# Patient Record
Sex: MALE | Race: OTHER | Employment: FULL TIME | URBAN - METROPOLITAN AREA
[De-identification: names, ages, dates, MRNs, and addresses within clinical notes are randomized per-mention and may not be internally consistent; named-entity substitution may affect disease eponyms.]

---

## 2017-01-09 ENCOUNTER — LAB ENCOUNTER (OUTPATIENT)
Dept: LAB | Age: 52
End: 2017-01-09
Attending: FAMILY MEDICINE
Payer: COMMERCIAL

## 2017-01-09 ENCOUNTER — OFFICE VISIT (OUTPATIENT)
Dept: FAMILY MEDICINE CLINIC | Facility: CLINIC | Age: 52
End: 2017-01-09

## 2017-01-09 VITALS
WEIGHT: 298 LBS | DIASTOLIC BLOOD PRESSURE: 89 MMHG | HEIGHT: 65 IN | HEART RATE: 71 BPM | SYSTOLIC BLOOD PRESSURE: 137 MMHG | TEMPERATURE: 98 F | BODY MASS INDEX: 49.65 KG/M2

## 2017-01-09 DIAGNOSIS — Z00.00 WELL ADULT EXAM: ICD-10-CM

## 2017-01-09 DIAGNOSIS — E66.01 MORBID OBESITY WITH BMI OF 45.0-49.9, ADULT (HCC): ICD-10-CM

## 2017-01-09 DIAGNOSIS — E78.00 HYPERCHOLESTEREMIA: ICD-10-CM

## 2017-01-09 DIAGNOSIS — I10 ESSENTIAL HYPERTENSION: Primary | ICD-10-CM

## 2017-01-09 DIAGNOSIS — I10 ESSENTIAL HYPERTENSION: ICD-10-CM

## 2017-01-09 LAB
ALT SERPL-CCNC: 51 U/L (ref 17–63)
ANION GAP SERPL CALC-SCNC: 10 MMOL/L (ref 0–18)
AST SERPL-CCNC: 41 U/L (ref 15–41)
BASOPHILS # BLD: 0.2 K/UL (ref 0–0.2)
BASOPHILS NFR BLD: 2 %
BILIRUB UR QL: NEGATIVE
BUN SERPL-MCNC: 13 MG/DL (ref 8–20)
BUN/CREAT SERPL: 13.3 (ref 10–20)
CALCIUM SERPL-MCNC: 9.3 MG/DL (ref 8.5–10.5)
CHLORIDE SERPL-SCNC: 107 MMOL/L (ref 95–110)
CHOLEST SERPL-MCNC: 128 MG/DL (ref 110–200)
CO2 SERPL-SCNC: 23 MMOL/L (ref 22–32)
COLOR UR: YELLOW
CREAT SERPL-MCNC: 0.98 MG/DL (ref 0.5–1.5)
EOSINOPHIL # BLD: 0.2 K/UL (ref 0–0.7)
EOSINOPHIL NFR BLD: 2 %
ERYTHROCYTE [DISTWIDTH] IN BLOOD BY AUTOMATED COUNT: 12.7 % (ref 11–15)
GLUCOSE SERPL-MCNC: 103 MG/DL (ref 70–99)
GLUCOSE UR-MCNC: NEGATIVE MG/DL
HCT VFR BLD AUTO: 45.2 % (ref 41–52)
HDLC SERPL-MCNC: 34 MG/DL
HGB BLD-MCNC: 15.3 G/DL (ref 13.5–17.5)
HGB UR QL STRIP.AUTO: NEGATIVE
HYALINE CASTS #/AREA URNS AUTO: 1 /LPF
KETONES UR-MCNC: NEGATIVE MG/DL
LDLC SERPL CALC-MCNC: 76 MG/DL (ref 0–99)
LEUKOCYTE ESTERASE UR QL STRIP.AUTO: NEGATIVE
LYMPHOCYTES # BLD: 3.1 K/UL (ref 1–4)
LYMPHOCYTES NFR BLD: 35 %
MCH RBC QN AUTO: 30.7 PG (ref 27–32)
MCHC RBC AUTO-ENTMCNC: 33.8 G/DL (ref 32–37)
MCV RBC AUTO: 90.8 FL (ref 80–100)
MONOCYTES # BLD: 0.5 K/UL (ref 0–1)
MONOCYTES NFR BLD: 6 %
NEUTROPHILS # BLD AUTO: 4.9 K/UL (ref 1.8–7.7)
NEUTROPHILS NFR BLD: 55 %
NITRITE UR QL STRIP.AUTO: NEGATIVE
NONHDLC SERPL-MCNC: 94 MG/DL
OSMOLALITY UR CALC.SUM OF ELEC: 290 MOSM/KG (ref 275–295)
PH UR: 5 [PH] (ref 5–8)
PLATELET # BLD AUTO: 183 K/UL (ref 140–400)
PMV BLD AUTO: 10.3 FL (ref 7.4–10.3)
POTASSIUM SERPL-SCNC: 3.4 MMOL/L (ref 3.3–5.1)
PROT UR-MCNC: 100 MG/DL
PSA SERPL-MCNC: 0.6 NG/ML (ref 0–4)
RBC # BLD AUTO: 4.98 M/UL (ref 4.5–5.9)
RBC #/AREA URNS AUTO: 2 /HPF
SODIUM SERPL-SCNC: 140 MMOL/L (ref 136–144)
SP GR UR STRIP: 1.03 (ref 1–1.03)
TRIGL SERPL-MCNC: 90 MG/DL (ref 1–149)
TSH SERPL-ACNC: 1.32 UIU/ML (ref 0.34–5.6)
UROBILINOGEN UR STRIP-ACNC: <2
VIT B12 SERPL-MCNC: 564 PG/ML (ref 181–914)
VIT C UR-MCNC: NEGATIVE MG/DL
WBC # BLD AUTO: 8.9 K/UL (ref 4–11)
WBC #/AREA URNS AUTO: 6 /HPF

## 2017-01-09 PROCEDURE — 84460 ALANINE AMINO (ALT) (SGPT): CPT

## 2017-01-09 PROCEDURE — 84450 TRANSFERASE (AST) (SGOT): CPT

## 2017-01-09 PROCEDURE — 36415 COLL VENOUS BLD VENIPUNCTURE: CPT

## 2017-01-09 PROCEDURE — 99214 OFFICE O/P EST MOD 30 MIN: CPT | Performed by: FAMILY MEDICINE

## 2017-01-09 PROCEDURE — 80061 LIPID PANEL: CPT

## 2017-01-09 PROCEDURE — 99212 OFFICE O/P EST SF 10 MIN: CPT | Performed by: FAMILY MEDICINE

## 2017-01-09 PROCEDURE — 80048 BASIC METABOLIC PNL TOTAL CA: CPT

## 2017-01-09 PROCEDURE — 82607 VITAMIN B-12: CPT

## 2017-01-09 PROCEDURE — 84443 ASSAY THYROID STIM HORMONE: CPT

## 2017-01-09 PROCEDURE — 81001 URINALYSIS AUTO W/SCOPE: CPT

## 2017-01-09 PROCEDURE — 85025 COMPLETE CBC W/AUTO DIFF WBC: CPT

## 2017-01-09 RX ORDER — LISINOPRIL 5 MG/1
5 TABLET ORAL DAILY
Qty: 90 TABLET | Refills: 0 | Status: SHIPPED | OUTPATIENT
Start: 2017-01-09 | End: 2017-02-13 | Stop reason: DRUGHIGH

## 2017-01-09 NOTE — PROGRESS NOTES
Brunilda Farr is a 49year old male. HPI:   Patient presents for recheck of his hypertension.  Pt has been taking medications as instructed, no medication side effects,       Wt Readings from Last 6 Encounters:  01/09/17 : 298 lb (135.172 kg)  05/03/16 surgical history.    Social History:      Smoking Status: Current Every Day Smoker        Packs/Day: 0.50  Years:         Smokeless Status: Never Used                        Alcohol Use: No                 Comment: was heavy drinker, a month ago        [facility] eating habits as well as increasing vegetable and fruit intake. Recommending avoiding foods high in fat content. Recommend exercising at least 30-40 minutes 5-6 days a week. Avoid skipping meals.   Making healthy choices for snacks and also limiting suga

## 2017-01-13 ENCOUNTER — TELEPHONE (OUTPATIENT)
Dept: FAMILY MEDICINE CLINIC | Facility: CLINIC | Age: 52
End: 2017-01-13

## 2017-01-13 DIAGNOSIS — R73.9 HYPERGLYCEMIA: Primary | ICD-10-CM

## 2017-01-13 DIAGNOSIS — R82.90 ABNORMAL URINE FINDINGS: ICD-10-CM

## 2017-01-13 NOTE — TELEPHONE ENCOUNTER
----- Message from Ascencion Bonilla MD sent at 1/12/2017  6:13 PM CST -----  Labs ok other than glucose slightly elevated, pls add HbA1C, dx : hyperglycemia  Also urine may have bee contaminated, repeat clean UA, dx: abnormal urine findings

## 2017-01-13 NOTE — TELEPHONE ENCOUNTER
Called patient, no answer. Left message to return call. Ext [phone]  See results note from MD below. Patient is to have a repeat UA since urine was contaminated and also A1C since fasting blood sugar was elevated.   The rest of her blood test results were ok

## 2017-01-25 ENCOUNTER — APPOINTMENT (OUTPATIENT)
Dept: LAB | Age: 52
End: 2017-01-25
Attending: FAMILY MEDICINE
Payer: COMMERCIAL

## 2017-01-25 DIAGNOSIS — R82.90 ABNORMAL URINE FINDINGS: ICD-10-CM

## 2017-01-25 DIAGNOSIS — R73.9 HYPERGLYCEMIA: ICD-10-CM

## 2017-01-25 LAB
BACTERIA UR QL AUTO: NEGATIVE /HPF
BILIRUB UR QL: NEGATIVE
CLARITY UR: CLEAR
COLOR UR: YELLOW
GLUCOSE UR-MCNC: NEGATIVE MG/DL
HBA1C MFR BLD: 5.8 % (ref 4–6)
HGB UR QL STRIP.AUTO: NEGATIVE
KETONES UR-MCNC: NEGATIVE MG/DL
LEUKOCYTE ESTERASE UR QL STRIP.AUTO: NEGATIVE
NITRITE UR QL STRIP.AUTO: NEGATIVE
PH UR: 6 [PH] (ref 5–8)
PROT UR-MCNC: 100 MG/DL
RBC #/AREA URNS AUTO: <1 /HPF
SP GR UR STRIP: 1.02 (ref 1–1.03)
UROBILINOGEN UR STRIP-ACNC: <2
VIT C UR-MCNC: NEGATIVE MG/DL
WBC #/AREA URNS AUTO: 2 /HPF

## 2017-01-25 PROCEDURE — 81001 URINALYSIS AUTO W/SCOPE: CPT

## 2017-01-25 PROCEDURE — 83036 HEMOGLOBIN GLYCOSYLATED A1C: CPT

## 2017-01-25 PROCEDURE — 36415 COLL VENOUS BLD VENIPUNCTURE: CPT

## 2017-01-25 RX ORDER — METOPROLOL SUCCINATE 100 MG/1
TABLET, EXTENDED RELEASE ORAL
Qty: 30 TABLET | Refills: 2 | Status: SHIPPED | OUTPATIENT
Start: 2017-01-25 | End: 2017-02-13

## 2017-01-25 NOTE — TELEPHONE ENCOUNTER
Rx request for Metoprolol Succinate  mg, PASSED Hypertension Protocol. Rx filled per protocol.      Hypertensive Medications  Protocol Criteria:  · Appointment scheduled in the past 6 months or in the next 3 months  · BMP or CMP in the past 12 months

## 2017-02-13 ENCOUNTER — OFFICE VISIT (OUTPATIENT)
Dept: FAMILY MEDICINE CLINIC | Facility: CLINIC | Age: 52
End: 2017-02-13

## 2017-02-13 VITALS
WEIGHT: 298 LBS | BODY MASS INDEX: 49.65 KG/M2 | SYSTOLIC BLOOD PRESSURE: 146 MMHG | DIASTOLIC BLOOD PRESSURE: 89 MMHG | TEMPERATURE: 98 F | HEIGHT: 65 IN | HEART RATE: 73 BPM

## 2017-02-13 DIAGNOSIS — Z00.00 WELL ADULT EXAM: ICD-10-CM

## 2017-02-13 DIAGNOSIS — Z12.11 COLON CANCER SCREENING: ICD-10-CM

## 2017-02-13 DIAGNOSIS — I10 ESSENTIAL HYPERTENSION: Primary | ICD-10-CM

## 2017-02-13 DIAGNOSIS — E78.00 HYPERCHOLESTEREMIA: ICD-10-CM

## 2017-02-13 DIAGNOSIS — E66.01 MORBID OBESITY WITH BMI OF 45.0-49.9, ADULT (HCC): ICD-10-CM

## 2017-02-13 PROCEDURE — 99396 PREV VISIT EST AGE 40-64: CPT | Performed by: FAMILY MEDICINE

## 2017-02-13 RX ORDER — LISINOPRIL 10 MG/1
10 TABLET ORAL DAILY
Qty: 90 TABLET | Refills: 0 | Status: SHIPPED | OUTPATIENT
Start: 2017-02-13 | End: 2017-06-24

## 2017-02-13 RX ORDER — METOPROLOL SUCCINATE 100 MG/1
100 TABLET, EXTENDED RELEASE ORAL DAILY
Qty: 90 TABLET | Refills: 0 | Status: SHIPPED | OUTPATIENT
Start: 2017-02-13 | End: 2017-12-14

## 2017-02-13 RX ORDER — ATORVASTATIN CALCIUM 40 MG/1
40 TABLET, FILM COATED ORAL NIGHTLY
Qty: 90 TABLET | Refills: 0 | Status: SHIPPED | OUTPATIENT
Start: 2017-02-13 | End: 2017-06-24

## 2017-02-13 RX ORDER — HYDROCHLOROTHIAZIDE 25 MG/1
25 TABLET ORAL EVERY MORNING
Qty: 90 TABLET | Refills: 0 | Status: SHIPPED | OUTPATIENT
Start: 2017-02-13 | End: 2017-02-13

## 2017-02-13 RX ORDER — HYDROCHLOROTHIAZIDE 25 MG/1
25 TABLET ORAL EVERY MORNING
Qty: 90 TABLET | Refills: 0 | Status: SHIPPED | OUTPATIENT
Start: 2017-02-13 | End: 2017-06-24

## 2017-02-13 NOTE — PATIENT INSTRUCTIONS
Clifford Browinng alimentación alex  Jennifer alimentación alex puede reducir [employer] de los riesgos a simpson darcy. Puede ayudarle a bajar el nivel de colesterol, la presión arterial y [place]. Simpson dieta no tiene por qué ser insípida o aburrida para ser saludable.  Simplemente s La johnnie presión arterial (hipertensión) es jolynn enfermedad crónica. En la [employer], se desconoce la causa. Por lo general, se la puede mantener bajo control modificando algunos hábitos o con ayuda de ciertos medicamentos.  Algunos de los síntomas 9. Evite anders medicamentos que tengan estimulantes del corazón. Por ejemplo, muchas de las pastillas y sprays descongestionantes para los senos paranasales y los que se usan para resfriados, así clemente las pastillas para adelgazar.  Geovanna el prospecto para con © 2458-0487 The [facility], [employer] Combes Lyndsay. Todos los derechos reservados. Esta información no pretende sustituir la atención médica profesional. Sólo simpson médico puede diagnosticar y tratar un problema de darcy.

## 2017-02-13 NOTE — PROGRESS NOTES
Jerel Hernandez is a 47year old male who presents for a complete physical exam.   HPI:   Pt complains of Patient presents with:  Physical: Annual physical      Wt Readings from Last 6 Encounters:  02/13/17 : 298 lb (135.172 kg)  01/09/17 : 298 lb (135.172 02/08/2017  Tobacco Cessation Counseling 2 Years due on 05/03/2018  PSA due on 01/09/2019    REVIEW OF SYSTEMS:   GENERAL: feels well otherwise  SKIN: denies any unusual skin lesions  EYES:denies blurred vision or double vision  HEENT: denies nasal congest physicals  Follow up with dentist every 6 months  Follow up with eye doctor yearly  Exercise for 30mins most days of the week  1/2 - 1 lb weight loss per week: goal 5-10 pounds  Yearly flu shot  Tetanus booster every 10 years  Labs reviewed    3.  Hyperchol

## 2017-06-27 RX ORDER — ATORVASTATIN CALCIUM 40 MG/1
TABLET, FILM COATED ORAL
Qty: 30 TABLET | Refills: 2 | Status: SHIPPED | OUTPATIENT
Start: 2017-06-27 | End: 2018-04-09

## 2017-06-27 RX ORDER — HYDROCHLOROTHIAZIDE 25 MG/1
TABLET ORAL
Qty: 30 TABLET | Refills: 2 | Status: SHIPPED | OUTPATIENT
Start: 2017-06-27 | End: 2017-12-14

## 2017-06-27 RX ORDER — LISINOPRIL 10 MG/1
TABLET ORAL
Qty: 30 TABLET | Refills: 2 | Status: SHIPPED | OUTPATIENT
Start: 2017-06-27 | End: 2017-12-14

## 2017-06-27 NOTE — TELEPHONE ENCOUNTER
Refilled per protocol    Hypertensive Medications  Protocol Criteria:  · Appointment scheduled in the past 6 months or in the next 3 months  · BMP or CMP in the past 12 months  · Creatinine result < 2  Recent Outpatient Visits            4 months ago Marisol Iliana Alfaro MD    Office Visit    1 year ago Essential hypertension with goal blood pressure less than 140/90    Iliana Gallagher MD    Office Visit    1 year ago [facility],

## 2017-12-08 ENCOUNTER — HOSPITAL ENCOUNTER (INPATIENT)
Facility: HOSPITAL | Age: 52
LOS: 3 days | Discharge: HOME | DRG: 287 | End: 2017-12-11
Attending: EMERGENCY MEDICINE | Admitting: HOSPITALIST
Payer: COMMERCIAL

## 2017-12-08 ENCOUNTER — APPOINTMENT (OUTPATIENT)
Dept: GENERAL RADIOLOGY | Facility: HOSPITAL | Age: 52
DRG: 287 | End: 2017-12-08
Attending: EMERGENCY MEDICINE
Payer: COMMERCIAL

## 2017-12-08 DIAGNOSIS — R07.9 ACUTE CHEST PAIN: ICD-10-CM

## 2017-12-08 DIAGNOSIS — F10.920 ALCOHOLIC INTOXICATION WITHOUT COMPLICATION (HCC): ICD-10-CM

## 2017-12-08 DIAGNOSIS — I10 HYPERTENSION, UNSPECIFIED TYPE: Primary | ICD-10-CM

## 2017-12-08 PROCEDURE — 83690 ASSAY OF LIPASE: CPT | Performed by: EMERGENCY MEDICINE

## 2017-12-08 PROCEDURE — 80061 LIPID PANEL: CPT | Performed by: EMERGENCY MEDICINE

## 2017-12-08 PROCEDURE — 80048 BASIC METABOLIC PNL TOTAL CA: CPT | Performed by: EMERGENCY MEDICINE

## 2017-12-08 PROCEDURE — 96361 HYDRATE IV INFUSION ADD-ON: CPT

## 2017-12-08 PROCEDURE — 80320 DRUG SCREEN QUANTALCOHOLS: CPT | Performed by: EMERGENCY MEDICINE

## 2017-12-08 PROCEDURE — 96366 THER/PROPH/DIAG IV INF ADDON: CPT

## 2017-12-08 PROCEDURE — 84484 ASSAY OF TROPONIN QUANT: CPT | Performed by: EMERGENCY MEDICINE

## 2017-12-08 PROCEDURE — 71020 XR CHEST PA + LAT CHEST (CPT=71020): CPT | Performed by: EMERGENCY MEDICINE

## 2017-12-08 PROCEDURE — 96375 TX/PRO/DX INJ NEW DRUG ADDON: CPT

## 2017-12-08 PROCEDURE — 96365 THER/PROPH/DIAG IV INF INIT: CPT

## 2017-12-08 PROCEDURE — 99285 EMERGENCY DEPT VISIT HI MDM: CPT

## 2017-12-08 PROCEDURE — 85379 FIBRIN DEGRADATION QUANT: CPT | Performed by: EMERGENCY MEDICINE

## 2017-12-08 PROCEDURE — 81001 URINALYSIS AUTO W/SCOPE: CPT | Performed by: EMERGENCY MEDICINE

## 2017-12-08 PROCEDURE — 85025 COMPLETE CBC W/AUTO DIFF WBC: CPT | Performed by: EMERGENCY MEDICINE

## 2017-12-08 PROCEDURE — 80076 HEPATIC FUNCTION PANEL: CPT | Performed by: EMERGENCY MEDICINE

## 2017-12-08 RX ORDER — LORAZEPAM 2 MG/ML
1 INJECTION INTRAMUSCULAR ONCE
Status: COMPLETED | OUTPATIENT
Start: 2017-12-08 | End: 2017-12-08

## 2017-12-08 RX ORDER — METOPROLOL TARTRATE 50 MG/1
50 TABLET, FILM COATED ORAL ONCE
Status: COMPLETED | OUTPATIENT
Start: 2017-12-08 | End: 2017-12-08

## 2017-12-08 RX ORDER — ASPIRIN 325 MG
325 TABLET ORAL ONCE
Status: COMPLETED | OUTPATIENT
Start: 2017-12-08 | End: 2017-12-08

## 2017-12-08 RX ORDER — LISINOPRIL 10 MG/1
10 TABLET ORAL DAILY
COMMUNITY
End: 2017-12-11

## 2017-12-09 PROCEDURE — 80307 DRUG TEST PRSMV CHEM ANLYZR: CPT | Performed by: HOSPITALIST

## 2017-12-09 PROCEDURE — 93010 ELECTROCARDIOGRAM REPORT: CPT | Performed by: HOSPITALIST

## 2017-12-09 PROCEDURE — 84484 ASSAY OF TROPONIN QUANT: CPT | Performed by: HOSPITALIST

## 2017-12-09 PROCEDURE — 93005 ELECTROCARDIOGRAM TRACING: CPT

## 2017-12-09 PROCEDURE — 85610 PROTHROMBIN TIME: CPT | Performed by: HOSPITALIST

## 2017-12-09 PROCEDURE — 85730 THROMBOPLASTIN TIME PARTIAL: CPT | Performed by: HOSPITALIST

## 2017-12-09 RX ORDER — LISINOPRIL 10 MG/1
10 TABLET ORAL DAILY
Status: DISCONTINUED | OUTPATIENT
Start: 2017-12-09 | End: 2017-12-09

## 2017-12-09 RX ORDER — LORAZEPAM 2 MG/ML
1 INJECTION INTRAMUSCULAR
Status: DISCONTINUED | OUTPATIENT
Start: 2017-12-09 | End: 2017-12-12

## 2017-12-09 RX ORDER — METOPROLOL TARTRATE 50 MG/1
50 TABLET, FILM COATED ORAL
Status: DISCONTINUED | OUTPATIENT
Start: 2017-12-09 | End: 2017-12-12

## 2017-12-09 RX ORDER — ASPIRIN 325 MG
325 TABLET ORAL DAILY
Status: DISCONTINUED | OUTPATIENT
Start: 2017-12-09 | End: 2017-12-12

## 2017-12-09 RX ORDER — PANTOPRAZOLE SODIUM 40 MG/1
40 TABLET, DELAYED RELEASE ORAL
Status: DISCONTINUED | OUTPATIENT
Start: 2017-12-10 | End: 2017-12-09

## 2017-12-09 RX ORDER — SODIUM PHOSPHATE, DIBASIC AND SODIUM PHOSPHATE, MONOBASIC 7; 19 G/133ML; G/133ML
1 ENEMA RECTAL ONCE AS NEEDED
Status: DISCONTINUED | OUTPATIENT
Start: 2017-12-09 | End: 2017-12-12

## 2017-12-09 RX ORDER — ONDANSETRON 2 MG/ML
4 INJECTION INTRAMUSCULAR; INTRAVENOUS EVERY 6 HOURS PRN
Status: DISCONTINUED | OUTPATIENT
Start: 2017-12-09 | End: 2017-12-12

## 2017-12-09 RX ORDER — POLYETHYLENE GLYCOL 3350 17 G/17G
17 POWDER, FOR SOLUTION ORAL DAILY PRN
Status: DISCONTINUED | OUTPATIENT
Start: 2017-12-09 | End: 2017-12-12

## 2017-12-09 RX ORDER — HYDRALAZINE HYDROCHLORIDE 20 MG/ML
10 INJECTION INTRAMUSCULAR; INTRAVENOUS EVERY 4 HOURS PRN
Status: DISCONTINUED | OUTPATIENT
Start: 2017-12-09 | End: 2017-12-12

## 2017-12-09 RX ORDER — HEPARIN SODIUM 5000 [USP'U]/ML
5000 INJECTION, SOLUTION INTRAVENOUS; SUBCUTANEOUS EVERY 8 HOURS SCHEDULED
Status: DISCONTINUED | OUTPATIENT
Start: 2017-12-09 | End: 2017-12-12

## 2017-12-09 RX ORDER — LORAZEPAM 2 MG/ML
2 INJECTION INTRAMUSCULAR
Status: DISCONTINUED | OUTPATIENT
Start: 2017-12-09 | End: 2017-12-12

## 2017-12-09 RX ORDER — ACETAMINOPHEN 325 MG/1
650 TABLET ORAL EVERY 6 HOURS PRN
Status: DISCONTINUED | OUTPATIENT
Start: 2017-12-09 | End: 2017-12-12

## 2017-12-09 RX ORDER — LORAZEPAM 2 MG/1
2 TABLET ORAL
Status: DISCONTINUED | OUTPATIENT
Start: 2017-12-09 | End: 2017-12-12

## 2017-12-09 RX ORDER — ATORVASTATIN CALCIUM 40 MG/1
40 TABLET, FILM COATED ORAL NIGHTLY
Status: DISCONTINUED | OUTPATIENT
Start: 2017-12-09 | End: 2017-12-12

## 2017-12-09 RX ORDER — LORAZEPAM 1 MG/1
1 TABLET ORAL
Status: DISCONTINUED | OUTPATIENT
Start: 2017-12-09 | End: 2017-12-12

## 2017-12-09 RX ORDER — MULTIPLE VITAMINS W/ MINERALS TAB 9MG-400MCG
1 TAB ORAL DAILY
Status: DISCONTINUED | OUTPATIENT
Start: 2017-12-09 | End: 2017-12-12

## 2017-12-09 RX ORDER — SODIUM CHLORIDE 0.9 % (FLUSH) 0.9 %
3 SYRINGE (ML) INJECTION AS NEEDED
Status: DISCONTINUED | OUTPATIENT
Start: 2017-12-09 | End: 2017-12-12

## 2017-12-09 RX ORDER — DOCUSATE SODIUM 100 MG/1
100 CAPSULE, LIQUID FILLED ORAL 2 TIMES DAILY
Status: DISCONTINUED | OUTPATIENT
Start: 2017-12-09 | End: 2017-12-12

## 2017-12-09 RX ORDER — NITROGLYCERIN 0.4 MG/1
0.4 TABLET SUBLINGUAL EVERY 5 MIN PRN
Status: DISCONTINUED | OUTPATIENT
Start: 2017-12-09 | End: 2017-12-12

## 2017-12-09 RX ORDER — FAMOTIDINE 20 MG/1
20 TABLET ORAL 2 TIMES DAILY
Status: DISCONTINUED | OUTPATIENT
Start: 2017-12-09 | End: 2017-12-12

## 2017-12-09 RX ORDER — FOLIC ACID 1 MG/1
1 TABLET ORAL DAILY
Status: DISCONTINUED | OUTPATIENT
Start: 2017-12-09 | End: 2017-12-12

## 2017-12-09 RX ORDER — MELATONIN
100 DAILY
Status: DISCONTINUED | OUTPATIENT
Start: 2017-12-10 | End: 2017-12-12

## 2017-12-09 RX ORDER — BISACODYL 10 MG
10 SUPPOSITORY, RECTAL RECTAL
Status: DISCONTINUED | OUTPATIENT
Start: 2017-12-09 | End: 2017-12-12

## 2017-12-09 NOTE — PLAN OF CARE
Altered Communication/Language Barrier    • Patient/Family is able to understand and participate in their care Progressing        CARDIOVASCULAR - ADULT    • Maintains optimal cardiac output and hemodynamic stability Progressing        Integumentary status

## 2017-12-09 NOTE — ED INITIAL ASSESSMENT (HPI)
PT reports anxiety, tremors recently, states that he has been drinking frequently, also has not taken his bp meds in 8 months. Pt alert, cooperative, oriented X4 at time of triage, ambulatory with steady gait.

## 2017-12-09 NOTE — ED PROVIDER NOTES
Patient Seen in: [facility] AND [facility] Emergency Department     History   Patient presents with:   Anxiety    Stated Complaint:     HPI    47year old male with history of hypertension alcohol abuse, reports that he is not taking his blood pressure medication 6 membranes are normal. Mucous membranes are not pale and not cyanotic. Eyes: Conjunctivae and lids are normal. Right conjunctiva is not injected. Left conjunctiva is not injected. No scleral icterus.  Pupils are equal.   Neck: Normal range of motion and ph following orders were created for panel order CBC WITH DIFFERENTIAL WITH PLATELET.   Procedure                               Abnormality         Status                     ---------                               -----------         ------ Bag 12/8/17 2012)   LORazepam (ATIVAN) injection 1 mg (1 mg Intravenous Given 12/8/17 2013)   M. V.I Adult (INFUVITE) 10 mL, Thiamine HCl 576 mg, folic acid (FOLVITE) 1 mg in dextrose 5 % 1,000 mL infusion (1,000 mL/hr Intravenous New Bag 12/8/17 2059)   Me review and interpretation of the above emergency department workup, the patient was found to have Hypertension, unspecified type  (primary encounter diagnosis)  Acute chest pain  Alcoholic intoxication without complication ([facility] [address]    Plan: General supportive

## 2017-12-10 PROCEDURE — 85025 COMPLETE CBC W/AUTO DIFF WBC: CPT | Performed by: HOSPITALIST

## 2017-12-10 PROCEDURE — 84484 ASSAY OF TROPONIN QUANT: CPT | Performed by: HOSPITALIST

## 2017-12-10 PROCEDURE — 80048 BASIC METABOLIC PNL TOTAL CA: CPT | Performed by: HOSPITALIST

## 2017-12-10 PROCEDURE — 83735 ASSAY OF MAGNESIUM: CPT | Performed by: HOSPITALIST

## 2017-12-10 NOTE — CONSULTS
Reason for Consultation: chest pain    Assessment/Plan:     1. ETOH abuse  - admitted for detox  2. Chest pain  - denies now by mentioned to ER doc  - borderline trop  - no evidence of ACS  - multiple risk factors  3. HTN  4. HLD  5.  Tobacco abuse      SHALA powder packet 17 g 17 g Oral Daily PRN   magnesium hydroxide (MILK OF MAGNESIA) 400 MG/5ML suspension 30 mL 30 mL Oral Daily PRN   bisacodyl (DULCOLAX) rectal suppository 10 mg 10 mg Rectal Daily PRN   FLEET ENEMA (FLEET) 7-19 GM/118ML enema 133 mL 1 enema CONS: well developed, well nourished. HEAD/FACE:no trauma, normocephalic. EYES:conjunctiva not injected, no xanthelasma. ENT:mucosa pink and moist. NECK:jugular venous pressure not elevated. RESP:normal rate and rhythm, clear to auscultation.    GI

## 2017-12-10 NOTE — H&P
[facility] Hospitalist H&P       CC: Patient presents with: Anxiety       PCP: No primary care provider on file.     ASSESSMENT / PLAN:   Patient is a Indonesian-speaking  47year old male with PMH sig for HTN and etoh use who presents wanting to detox    Etoh intox Denies chest pain/sob , does not remember mentioning that to ER physician.     Per ER note pt mentioned L -sided chest pain which seemed musculoskeletal.      PMH  Past Medical History:   Diagnosis Date   • Essential hypertension         PSH  L hand surgery 105   CO2  25       Lab Results  Component Value Date   WBC 6.1 12/08/2017   HGB 14.9 12/08/2017   HCT 44.1 12/08/2017    12/08/2017   CREATSERUM 0.80 12/08/2017   BUN 10 12/08/2017    12/08/2017   K 3.4 12/08/2017    12/08/2017   CO2 25

## 2017-12-10 NOTE — PROGRESS NOTES
[facility] Hospitalist Progress Note     CC: Hospital Follow up    PCP: No primary care provider on file.        Assessment/Plan:     Principal Problem:    Hypertension, unspecified type  Active Problems:    Hypertension    Acute chest pain    Alcoholic intoxicati pressure 155/99, pulse 78, temperature 98 °F (36.7 °C), temperature source Oral, resp. rate 20, height 170.2 cm (5' 7\"), weight (!) 300 lb 11.2 oz (136.4 kg), SpO2 97 %.     Temp:  [98 °F (36.7 °C)-98.2 °F (36.8 °C)] 98 °F (36.7 °C)  Pulse:  [74-95] 78  Re multivitamin with minerals  1 tablet Oral Daily   • folic acid  1 mg Oral Daily   • thiamine (VITAMIN B1) IVPB  100 mg Intravenous Once   • metoprolol Tartrate  50 mg Oral 2x Daily(Beta Blocker)   • atorvastatin  40 mg Oral Nightly   • famoTIDine  20 mg Or

## 2017-12-11 ENCOUNTER — APPOINTMENT (OUTPATIENT)
Dept: CV DIAGNOSTICS | Facility: HOSPITAL | Age: 52
DRG: 287 | End: 2017-12-11
Attending: HOSPITALIST
Payer: COMMERCIAL

## 2017-12-11 ENCOUNTER — APPOINTMENT (OUTPATIENT)
Dept: CV DIAGNOSTICS | Facility: HOSPITAL | Age: 52
DRG: 287 | End: 2017-12-11
Attending: INTERNAL MEDICINE
Payer: COMMERCIAL

## 2017-12-11 ENCOUNTER — APPOINTMENT (OUTPATIENT)
Dept: INTERVENTIONAL RADIOLOGY/VASCULAR | Facility: HOSPITAL | Age: 52
DRG: 287 | End: 2017-12-11
Attending: INTERNAL MEDICINE
Payer: COMMERCIAL

## 2017-12-11 ENCOUNTER — APPOINTMENT (OUTPATIENT)
Dept: NUCLEAR MEDICINE | Facility: HOSPITAL | Age: 52
DRG: 287 | End: 2017-12-11
Attending: INTERNAL MEDICINE
Payer: COMMERCIAL

## 2017-12-11 VITALS
TEMPERATURE: 98 F | HEART RATE: 79 BPM | DIASTOLIC BLOOD PRESSURE: 76 MMHG | SYSTOLIC BLOOD PRESSURE: 128 MMHG | BODY MASS INDEX: 47.5 KG/M2 | RESPIRATION RATE: 16 BRPM | OXYGEN SATURATION: 97 % | HEIGHT: 67 IN | WEIGHT: 302.63 LBS

## 2017-12-11 PROCEDURE — 4A023N7 MEASUREMENT OF CARDIAC SAMPLING AND PRESSURE, LEFT HEART, PERCUTANEOUS APPROACH: ICD-10-PCS | Performed by: INTERNAL MEDICINE

## 2017-12-11 PROCEDURE — 78452 HT MUSCLE IMAGE SPECT MULT: CPT | Performed by: INTERNAL MEDICINE

## 2017-12-11 PROCEDURE — 99153 MOD SED SAME PHYS/QHP EA: CPT

## 2017-12-11 PROCEDURE — 85610 PROTHROMBIN TIME: CPT | Performed by: INTERNAL MEDICINE

## 2017-12-11 PROCEDURE — B2151ZZ FLUOROSCOPY OF LEFT HEART USING LOW OSMOLAR CONTRAST: ICD-10-PCS | Performed by: INTERNAL MEDICINE

## 2017-12-11 PROCEDURE — 99152 MOD SED SAME PHYS/QHP 5/>YRS: CPT

## 2017-12-11 PROCEDURE — 93458 L HRT ARTERY/VENTRICLE ANGIO: CPT

## 2017-12-11 PROCEDURE — 85730 THROMBOPLASTIN TIME PARTIAL: CPT | Performed by: INTERNAL MEDICINE

## 2017-12-11 PROCEDURE — B41F1ZZ FLUOROSCOPY OF RIGHT LOWER EXTREMITY ARTERIES USING LOW OSMOLAR CONTRAST: ICD-10-PCS | Performed by: INTERNAL MEDICINE

## 2017-12-11 PROCEDURE — 80048 BASIC METABOLIC PNL TOTAL CA: CPT | Performed by: HOSPITALIST

## 2017-12-11 PROCEDURE — 93016 CV STRESS TEST SUPVJ ONLY: CPT | Performed by: HOSPITALIST

## 2017-12-11 PROCEDURE — 93306 TTE W/DOPPLER COMPLETE: CPT | Performed by: HOSPITALIST

## 2017-12-11 PROCEDURE — 93018 CV STRESS TEST I&R ONLY: CPT | Performed by: HOSPITALIST

## 2017-12-11 PROCEDURE — 93017 CV STRESS TEST TRACING ONLY: CPT | Performed by: INTERNAL MEDICINE

## 2017-12-11 PROCEDURE — B2111ZZ FLUOROSCOPY OF MULTIPLE CORONARY ARTERIES USING LOW OSMOLAR CONTRAST: ICD-10-PCS | Performed by: INTERNAL MEDICINE

## 2017-12-11 RX ORDER — MIDAZOLAM HYDROCHLORIDE 1 MG/ML
INJECTION INTRAMUSCULAR; INTRAVENOUS
Status: COMPLETED
Start: 2017-12-11 | End: 2017-12-11

## 2017-12-11 RX ORDER — CHLORHEXIDINE GLUCONATE 4 G/100ML
30 SOLUTION TOPICAL
Status: DISCONTINUED | OUTPATIENT
Start: 2017-12-12 | End: 2017-12-12

## 2017-12-11 RX ORDER — 0.9 % SODIUM CHLORIDE 0.9 %
VIAL (ML) INJECTION
Status: COMPLETED
Start: 2017-12-11 | End: 2017-12-11

## 2017-12-11 RX ORDER — POTASSIUM CHLORIDE 750 MG/1
10 TABLET, FILM COATED, EXTENDED RELEASE ORAL 2 TIMES DAILY
Qty: 30 TABLET | Refills: 0 | Status: SHIPPED | OUTPATIENT
Start: 2017-12-11 | End: 2017-12-28

## 2017-12-11 RX ORDER — MULTIPLE VITAMINS W/ MINERALS TAB 9MG-400MCG
1 TAB ORAL DAILY
Qty: 30 TABLET | Refills: 0 | Status: SHIPPED | OUTPATIENT
Start: 2017-12-12 | End: 2017-12-14

## 2017-12-11 RX ORDER — SODIUM CHLORIDE 9 MG/ML
INJECTION, SOLUTION INTRAVENOUS
Status: DISCONTINUED
Start: 2017-12-11 | End: 2017-12-12

## 2017-12-11 RX ORDER — ATORVASTATIN CALCIUM 40 MG/1
40 TABLET, FILM COATED ORAL NIGHTLY
Qty: 30 TABLET | Refills: 1 | Status: SHIPPED | OUTPATIENT
Start: 2017-12-11 | End: 2017-12-14

## 2017-12-11 RX ORDER — FAMOTIDINE 20 MG/1
20 TABLET ORAL 2 TIMES DAILY
Qty: 60 TABLET | Refills: 1 | Status: SHIPPED | OUTPATIENT
Start: 2017-12-11 | End: 2018-12-19 | Stop reason: ALTCHOICE

## 2017-12-11 RX ORDER — METOPROLOL TARTRATE 50 MG/1
50 TABLET, FILM COATED ORAL
Qty: 60 TABLET | Refills: 1 | Status: SHIPPED | OUTPATIENT
Start: 2017-12-11 | End: 2017-12-28

## 2017-12-11 RX ORDER — LIDOCAINE HYDROCHLORIDE 20 MG/ML
INJECTION, SOLUTION EPIDURAL; INFILTRATION; INTRACAUDAL; PERINEURAL
Status: COMPLETED
Start: 2017-12-11 | End: 2017-12-11

## 2017-12-11 RX ORDER — POTASSIUM CHLORIDE 20 MEQ/1
40 TABLET, EXTENDED RELEASE ORAL EVERY 4 HOURS
Status: COMPLETED | OUTPATIENT
Start: 2017-12-11 | End: 2017-12-11

## 2017-12-11 RX ORDER — LISINOPRIL AND HYDROCHLOROTHIAZIDE 12.5; 1 MG/1; MG/1
1 TABLET ORAL DAILY
Qty: 30 TABLET | Refills: 1 | Status: SHIPPED | OUTPATIENT
Start: 2017-12-11 | End: 2017-12-20 | Stop reason: ALTCHOICE

## 2017-12-11 RX ORDER — SODIUM CHLORIDE 9 MG/ML
INJECTION, SOLUTION INTRAVENOUS CONTINUOUS
Status: DISCONTINUED | OUTPATIENT
Start: 2017-12-11 | End: 2017-12-12

## 2017-12-11 RX ORDER — ASPIRIN 81 MG/1
324 TABLET, CHEWABLE ORAL ONCE
Status: DISCONTINUED | OUTPATIENT
Start: 2017-12-11 | End: 2017-12-12

## 2017-12-11 NOTE — IMAGING NOTE
10:00 - Patient here for inpatient lexiscan nuclear stress test. Resting ECG with abnormalities. Abnormalites new as compared to previous 12 lead ECG done this hospitalization. Patient asymptomatic. I called Dr. Dada Garza and discussed all the above.  Orders rec

## 2017-12-11 NOTE — PROGRESS NOTES
[facility]  Progress Note    Juan Davidiker Sexton Patient Status:  Inpatient    1965 MRN [id number]   Location [facility] 3W/SW Attending Tangela Guillermo, [address] [place] Day # 3 PCP No primary care provider on file. Impression:  1. LORazepam (ATIVAN) injection 1 mg 1 mg Intravenous Q1H PRN   Or      LORazepam (ATIVAN) tab 2 mg 2 mg Oral Q1H PRN   Or      LORazepam (ATIVAN) injection 2 mg 2 mg Intravenous Q1H PRN   docusate sodium (COLACE) cap 100 mg 100 mg Oral BID   PEG 3350 (RUEL lateral.  The resting images show near normalization. These findings are consistent with stress-induced ischemia. CONCLUSION:   1. Resting hypertension  2. Normal EKG response to stress  3.  Left ventricular enlargement with normal systolic function

## 2017-12-11 NOTE — PLAN OF CARE
Problem: Patient/Family Goals  Goal: Patient/Family Long Term Goal  Patient's Long Term Goal: to go home and stop drinking    Interventions:  - Monitoring pt status  - CIWA protocol  - Education  - Nutrition management  - Medication management  - See addit

## 2017-12-12 NOTE — DISCHARGE SUMMARY
[facility]  Hospitalist Discharge Summary   Patient ID:  Maxi Morales  [id number]  59 year old  1/8/1965    Admit date: 12/8/2017  Discharge date: 12/12/2017  Primary Care Physician: Juany Nick.  MD Bobby   Attending Physician: No att. providers found   Consults:   C seen, ok for d/c from cardiology standpoint after bed rest complete  -aspirin and statin on d/c  -pt instructed on risk of continuing aspirin while abusing etoh including GI bleed.     Hypokalemia  - replaced per protocol  - d/w on kcl supplements as on mi prescription for each of these medications  · aspirin 81 MG Tabs  · atorvastatin 40 MG Tabs  · famoTIDine 20 MG Tabs  · Lisinopril-Hydrochlorothiazide 10-12.5 MG Tabs  · Metoprolol Tartrate 50 MG Tabs  · multivitamin with minerals Tabs  · Potassium Chlorid

## 2017-12-12 NOTE — PROGRESS NOTES
Patient ready for discharge. MD notified. Patient's vital signs stable, access site dry and intact, no signs and symptoms of bleeding/ hematoma. Education provided. Patient verbalized understanding.  Family at bedside.

## 2017-12-12 NOTE — PROGRESS NOTES
[facility] Hospitalist Progress Note     CC: Hospital Follow up    PCP: No primary care provider on file.        Assessment/Plan:     Principal Problem:    Hypertension, unspecified type  Active Problems:    Hypertension    Acute chest pain    Alcoholic intoxicati line used. No CP/SOB. Denies feeling anxious. Wants to go home. OBJECTIVE:    Blood pressure 137/81, pulse 80, temperature 97.6 °F (36.4 °C), temperature source Oral, resp.  rate 16, height 170.2 cm (5' 7\"), weight (!) 302 lb 9.6 oz (137.3 kg), SpO2 97 suggesting atelectasis or scar.              Meds:     • Potassium Chloride ER  40 mEq Oral Q4H   • [START ON 12/12/2017] Chlorhexidine Gluconate  30 mL Topical On Call   • aspirin  324 mg Oral Once   • sodium chloride       • aspirin  325 mg Oral Daily   •

## 2017-12-12 NOTE — PROGRESS NOTES
[facility]   [id number]  12/11/2017    Post procedure/ recovery hand-off report given to KIRA Schwartz. Patient's vital signs stable, access site dry and intact, no signs and symptoms of bleeding/ hematoma.     Aguilar Chávez RN

## 2017-12-14 ENCOUNTER — OFFICE VISIT (OUTPATIENT)
Dept: FAMILY MEDICINE CLINIC | Facility: CLINIC | Age: 52
End: 2017-12-14

## 2017-12-14 ENCOUNTER — APPOINTMENT (OUTPATIENT)
Dept: LAB | Age: 52
End: 2017-12-14
Attending: FAMILY MEDICINE
Payer: COMMERCIAL

## 2017-12-14 VITALS
WEIGHT: 290 LBS | SYSTOLIC BLOOD PRESSURE: 143 MMHG | DIASTOLIC BLOOD PRESSURE: 86 MMHG | TEMPERATURE: 98 F | BODY MASS INDEX: 48.32 KG/M2 | HEART RATE: 69 BPM | HEIGHT: 65 IN

## 2017-12-14 DIAGNOSIS — E66.01 MORBID OBESITY WITH BMI OF 45.0-49.9, ADULT (HCC): ICD-10-CM

## 2017-12-14 DIAGNOSIS — R06.83 SNORING: ICD-10-CM

## 2017-12-14 DIAGNOSIS — E87.6 HYPOKALEMIA: ICD-10-CM

## 2017-12-14 DIAGNOSIS — I10 ESSENTIAL HYPERTENSION: ICD-10-CM

## 2017-12-14 DIAGNOSIS — I25.10 CORONARY ARTERY DISEASE INVOLVING NATIVE CORONARY ARTERY OF NATIVE HEART WITHOUT ANGINA PECTORIS: ICD-10-CM

## 2017-12-14 DIAGNOSIS — I10 ESSENTIAL HYPERTENSION: Primary | ICD-10-CM

## 2017-12-14 PROBLEM — R07.9 ACUTE CHEST PAIN: Status: RESOLVED | Noted: 2017-12-08 | Resolved: 2017-12-14

## 2017-12-14 PROCEDURE — 80053 COMPREHEN METABOLIC PANEL: CPT

## 2017-12-14 PROCEDURE — 36415 COLL VENOUS BLD VENIPUNCTURE: CPT

## 2017-12-14 PROCEDURE — 99212 OFFICE O/P EST SF 10 MIN: CPT | Performed by: FAMILY MEDICINE

## 2017-12-14 PROCEDURE — 99214 OFFICE O/P EST MOD 30 MIN: CPT | Performed by: FAMILY MEDICINE

## 2017-12-14 RX ORDER — ASPIRIN 81 MG
81 TABLET, DELAYED RELEASE (ENTERIC COATED) ORAL
Refills: 1 | COMMUNITY
Start: 2017-12-12 | End: 2018-12-19 | Stop reason: ALTCHOICE

## 2017-12-14 RX ORDER — POTASSIUM CHLORIDE 750 MG/1
TABLET, EXTENDED RELEASE ORAL
Refills: 0 | COMMUNITY
Start: 2017-12-12 | End: 2017-12-14

## 2017-12-14 RX ORDER — LISINOPRIL 10 MG/1
10 TABLET ORAL DAILY
COMMUNITY
End: 2017-12-28 | Stop reason: DRUGHIGH

## 2017-12-14 NOTE — PATIENT INSTRUCTIONS
Apnea Del Sueño [Sleep Apnea, Adult]  La Apnea del Sueño tambien se llama “Apnea Obstructiva del Sueño”. Ésta ocurre normalmente cuando los tejidos y [employer] de la parte posterior de la garganta se relajan demasiado sadiq el sueño.  [place] provoca que el con simpson médico en el próximo examen programado. Si usted está excedido de [place], hable con simpson médico acerca de un programa para adelgazar. Si usted fuma, hable con simpson doctor [employer] formas de ayudarle a abandonar el hábito.   Busque Prontamente Atención Méd ? ? ¿Le farrell hecho alguna vez lasha familiares, amigos o compañeros de trabajo comentarios sobre simpson consumo de alcohol? ? ? ¿Tiene amigos con los que valentine? ? ? ¿Piensa con agrado en simpson próxima copa?    ? ? ¿Piensa que no tiene ningún problema con el alcoho 2) Coma alimentos ricos en potasio. El [address] [place] [address] alto de [facility] se encuentra en la alcachofa, las praful asadas, la espinaca, el melón [place], el melón [place], el bacalao, el hipogloso (halibut), el salmón y los ostiones (vieiras).  Los frijoles

## 2017-12-14 NOTE — PROGRESS NOTES
Tj Rosas is a 47year old male. HPI:   Patient presents for recheck of his hypertension.  Pt has been taking medications as instructed, no medication side effects,   Patient was recently seen in the hospital and admitted on December 8, 2017 with alexander (!) 302 lb 9.6 oz (137.3 kg)  02/13/17 : 298 lb (135.2 kg)  01/09/17 : 298 lb (135.2 kg)  05/03/16 : 280 lb (127 kg)  03/28/16 : 292 lb (132.5 kg)    Body mass index is 48.26 kg/m².       Lab Results  Component Value Date   CHOLEST 321 (H) 12/08/2017   CHOL Oral Tab TAKE 1 TABLET BY MOUTH   ONCE NIGHTLY Disp: 30 tablet Rfl: 2   lisinopril 10 MG Oral Tab Take 10 mg by mouth daily. Disp:  Rfl:    Lisinopril-Hydrochlorothiazide (ZESTORETIC) 10-12.5 MG Oral Tab Take 1 tablet by mouth daily.  Disp: 30 tablet Rfl: 1 ([address]  Highly recommend to lose weight. Discussed good dietary and eating habits as well as increasing vegetable and fruit intake. Recommending avoiding foods high in fat content. Recommend exercising at least 30-40 minutes 5-6 days a week.   Avoid skipp

## 2017-12-20 PROBLEM — E87.6 HYPOKALEMIA: Status: ACTIVE | Noted: 2017-12-20

## 2017-12-20 PROBLEM — R74.8 ELEVATED LIVER ENZYMES: Status: ACTIVE | Noted: 2017-12-20

## 2017-12-21 ENCOUNTER — TELEPHONE (OUTPATIENT)
Dept: OTHER | Age: 52
End: 2017-12-21

## 2017-12-21 NOTE — TELEPHONE ENCOUNTER
Patient is requesting all the blood test results while he was admitted at the hospital 12/8-12/11/17 to be mail to the  address on file,patient Solomon Islander speaking , given phone consent to talk to her daughter,spoke with Abner Muñoz, daughter and  number given

## 2017-12-28 ENCOUNTER — OFFICE VISIT (OUTPATIENT)
Dept: FAMILY MEDICINE CLINIC | Facility: CLINIC | Age: 52
End: 2017-12-28

## 2017-12-28 ENCOUNTER — APPOINTMENT (OUTPATIENT)
Dept: LAB | Age: 52
End: 2017-12-28
Attending: FAMILY MEDICINE
Payer: COMMERCIAL

## 2017-12-28 VITALS
WEIGHT: 295 LBS | HEART RATE: 64 BPM | SYSTOLIC BLOOD PRESSURE: 146 MMHG | HEIGHT: 65 IN | BODY MASS INDEX: 49.15 KG/M2 | DIASTOLIC BLOOD PRESSURE: 80 MMHG | TEMPERATURE: 98 F | RESPIRATION RATE: 18 BRPM

## 2017-12-28 DIAGNOSIS — R74.8 ELEVATED LIVER ENZYMES: ICD-10-CM

## 2017-12-28 DIAGNOSIS — I10 ESSENTIAL HYPERTENSION: Primary | ICD-10-CM

## 2017-12-28 DIAGNOSIS — K59.09 OTHER CONSTIPATION: ICD-10-CM

## 2017-12-28 DIAGNOSIS — E87.6 HYPOKALEMIA: ICD-10-CM

## 2017-12-28 DIAGNOSIS — E78.00 HYPERCHOLESTEREMIA: ICD-10-CM

## 2017-12-28 DIAGNOSIS — Z12.11 COLON CANCER SCREENING: ICD-10-CM

## 2017-12-28 LAB
ALBUMIN SERPL BCP-MCNC: 3.9 G/DL (ref 3.5–4.8)
ALP SERPL-CCNC: 111 U/L (ref 32–100)
ALT SERPL-CCNC: 71 U/L (ref 17–63)
ANION GAP SERPL CALC-SCNC: 8 MMOL/L (ref 0–18)
AST SERPL-CCNC: 45 U/L (ref 15–41)
BILIRUB DIRECT SERPL-MCNC: 0.1 MG/DL (ref 0–0.2)
BILIRUB SERPL-MCNC: 0.5 MG/DL (ref 0.3–1.2)
BUN SERPL-MCNC: 15 MG/DL (ref 8–20)
BUN/CREAT SERPL: 14.2 (ref 10–20)
CALCIUM SERPL-MCNC: 9 MG/DL (ref 8.5–10.5)
CHLORIDE SERPL-SCNC: 106 MMOL/L (ref 95–110)
CO2 SERPL-SCNC: 24 MMOL/L (ref 22–32)
CREAT SERPL-MCNC: 1.06 MG/DL (ref 0.5–1.5)
GLUCOSE SERPL-MCNC: 95 MG/DL (ref 70–99)
OSMOLALITY UR CALC.SUM OF ELEC: 287 MOSM/KG (ref 275–295)
POTASSIUM SERPL-SCNC: 3.6 MMOL/L (ref 3.3–5.1)
PROT SERPL-MCNC: 7.3 G/DL (ref 5.9–8.4)
SODIUM SERPL-SCNC: 138 MMOL/L (ref 136–144)

## 2017-12-28 PROCEDURE — 80048 BASIC METABOLIC PNL TOTAL CA: CPT

## 2017-12-28 PROCEDURE — 36415 COLL VENOUS BLD VENIPUNCTURE: CPT

## 2017-12-28 PROCEDURE — 99214 OFFICE O/P EST MOD 30 MIN: CPT | Performed by: FAMILY MEDICINE

## 2017-12-28 PROCEDURE — 99212 OFFICE O/P EST SF 10 MIN: CPT | Performed by: FAMILY MEDICINE

## 2017-12-28 PROCEDURE — 80076 HEPATIC FUNCTION PANEL: CPT

## 2017-12-28 RX ORDER — LISINOPRIL 20 MG/1
20 TABLET ORAL DAILY
Qty: 90 TABLET | Refills: 0 | Status: SHIPPED | OUTPATIENT
Start: 2017-12-28 | End: 2018-01-09

## 2017-12-28 RX ORDER — METOPROLOL TARTRATE 50 MG/1
50 TABLET, FILM COATED ORAL 2 TIMES DAILY
Qty: 180 TABLET | Refills: 1 | Status: SHIPPED | OUTPATIENT
Start: 2017-12-28 | End: 2018-02-06

## 2017-12-28 RX ORDER — POLYETHYLENE GLYCOL 3350 17 G/17G
17 POWDER, FOR SOLUTION ORAL DAILY
Qty: 1 BOTTLE | Refills: 0 | Status: SHIPPED | OUTPATIENT
Start: 2017-12-28 | End: 2018-12-19 | Stop reason: ALTCHOICE

## 2017-12-28 NOTE — PROGRESS NOTES
HPI:    Patient ID: Carmine Padilla is a 47year old male. HPI     Patient due for follow-up on his blood pressure. Blood pressures remain somewhat elevated. He has not been drinking alcohol since his hospitalization recently.   He has been taking metop TABLET BY MOUTH   ONCE NIGHTLY Disp: 30 tablet Rfl: 2   Multiple Vitamin (ONE-DAILY MULTI VITAMINS) Oral Tab Take 1 tablet by mouth daily.  Disp:  Rfl:      Allergies:No Known Allergies   PHYSICAL EXAM:   Physical Exam   Constitutional: He is oriented to pe Imaging & Referrals:  [facility] - INTERNAL       ZP#[id number]

## 2017-12-28 NOTE — PATIENT INSTRUCTIONS
Trate el estreñimiento    El estreñimiento es un problema común y, a [place]. Si tiene estreñimiento, significa que evacua lasha intestinos menos de 3 veces a la semana o que debe hacer esfuerzo al ir al baño porque las heces son duras y secas.  Pue Es posible que simpson proveedor de atención médica le sugiera usar algún producto de venta valentine para ayudar a aliviar el estreñimiento. Puede que le sugiera usar agentes para incrementar el barber fecal o laxantes.  Los laxantes, si los [place] connor clemente le indique s

## 2018-01-04 ENCOUNTER — NURSE ONLY (OUTPATIENT)
Dept: FAMILY MEDICINE CLINIC | Facility: CLINIC | Age: 53
End: 2018-01-04

## 2018-01-04 VITALS — DIASTOLIC BLOOD PRESSURE: 104 MMHG | SYSTOLIC BLOOD PRESSURE: 154 MMHG | HEART RATE: 57 BPM

## 2018-01-04 DIAGNOSIS — Z01.30 BLOOD PRESSURE CHECK: Primary | ICD-10-CM

## 2018-01-04 PROCEDURE — 99212 OFFICE O/P EST SF 10 MIN: CPT | Performed by: FAMILY MEDICINE

## 2018-01-04 NOTE — PROGRESS NOTES
INCREASE LISINOPRIL TO 40MG DAILY  CONTINUE METOPROLOL  RETURN FOR BP CHECK 1 WEEK  LOW SALT DIET  AVOID ALCOHOL  PLS CALL PATIENT   (Citizen of [place] SPEAKING)

## 2018-01-04 NOTE — PROGRESS NOTES
Patient is here for a bp check, verified name,  , allergies, pharmacy and medications.  bp was taken 3xs and all readings were elevated, I did inform dr Mary Davis and was aware of readings

## 2018-01-05 ENCOUNTER — TELEPHONE (OUTPATIENT)
Dept: OTHER | Age: 53
End: 2018-01-05

## 2018-01-05 RX ORDER — LISINOPRIL 40 MG/1
40 TABLET ORAL DAILY
Qty: 90 TABLET | Refills: 0 | Status: CANCELLED | OUTPATIENT
Start: 2018-01-05

## 2018-01-05 NOTE — TELEPHONE ENCOUNTER
Kvng aGrcia MD   Em Rn Triage 23 hours ago (12:05 PM)      Kvng Garcia MD   Em Rn Triage 23 hours ago (12:05 PM)         INCREASE LISINOPRIL TO 40MG DAILY  CONTINUE METOPROLOL  RETURN FOR BP CHECK 1 WEEK  LOW SALT DIET  AVOID ALCOHOL  PLS CALL PATIENT

## 2018-01-05 NOTE — TELEPHONE ENCOUNTER
I called home number on file and spouse told me to call him at [phone] could not leave message. She will inform him to call us back. Thanks  Rn I have pended medication pls verified pharmacy and then send. pinky

## 2018-01-08 NOTE — TELEPHONE ENCOUNTER
No call back from pt, again no answer and voice mailbox not setup for message at [phone]. Staff to call again later.

## 2018-01-09 RX ORDER — LISINOPRIL 40 MG/1
40 TABLET ORAL DAILY
Qty: 90 TABLET | Refills: 0 | Status: SHIPPED | OUTPATIENT
Start: 2018-01-09 | End: 2018-02-06

## 2018-01-09 NOTE — TELEPHONE ENCOUNTER
Patient informed of results (identified name and ) and recommendations in Uzbek, as per Dr. Jaquelin Oconnor note copied below. Patient voiced understanding and agrees with recommendations. Pt was able to repeat back all instructions.  Was soft transferred to CSS

## 2018-02-06 ENCOUNTER — OFFICE VISIT (OUTPATIENT)
Dept: FAMILY MEDICINE CLINIC | Facility: CLINIC | Age: 53
End: 2018-02-06

## 2018-02-06 VITALS
SYSTOLIC BLOOD PRESSURE: 160 MMHG | TEMPERATURE: 99 F | HEART RATE: 60 BPM | BODY MASS INDEX: 47.48 KG/M2 | DIASTOLIC BLOOD PRESSURE: 90 MMHG | WEIGHT: 285 LBS | HEIGHT: 65 IN

## 2018-02-06 DIAGNOSIS — E87.6 HYPOKALEMIA: ICD-10-CM

## 2018-02-06 DIAGNOSIS — I10 ESSENTIAL HYPERTENSION: Primary | ICD-10-CM

## 2018-02-06 DIAGNOSIS — M17.0 PRIMARY OSTEOARTHRITIS OF BOTH KNEES: ICD-10-CM

## 2018-02-06 DIAGNOSIS — E78.1 HYPERTRIGLYCERIDEMIA: ICD-10-CM

## 2018-02-06 DIAGNOSIS — R74.8 ELEVATED LIVER ENZYMES: ICD-10-CM

## 2018-02-06 DIAGNOSIS — E78.00 HYPERCHOLESTEREMIA: ICD-10-CM

## 2018-02-06 PROCEDURE — 99212 OFFICE O/P EST SF 10 MIN: CPT | Performed by: FAMILY MEDICINE

## 2018-02-06 PROCEDURE — 99214 OFFICE O/P EST MOD 30 MIN: CPT | Performed by: FAMILY MEDICINE

## 2018-02-06 RX ORDER — AMLODIPINE BESYLATE 5 MG/1
5 TABLET ORAL DAILY
Qty: 90 TABLET | Refills: 0 | Status: SHIPPED | OUTPATIENT
Start: 2018-02-06 | End: 2018-04-09

## 2018-02-06 RX ORDER — METOPROLOL TARTRATE 50 MG/1
50 TABLET, FILM COATED ORAL 2 TIMES DAILY
Qty: 180 TABLET | Refills: 0 | Status: SHIPPED | OUTPATIENT
Start: 2018-02-06 | End: 2018-04-09

## 2018-02-06 RX ORDER — LISINOPRIL 40 MG/1
40 TABLET ORAL DAILY
Qty: 90 TABLET | Refills: 0 | Status: SHIPPED | OUTPATIENT
Start: 2018-02-06 | End: 2018-04-09

## 2018-02-06 NOTE — PROGRESS NOTES
Jesus Rasmussen is a 47year old male. HPI:   Patient presents for recheck of his hypertension.  Pt has been taking medications as instructed, no medication side effects, Wt Readings from Last 6 Encounters:  02/06/18 : 285 lb (129.3 kg)  12/28/17 : 295 l (two) times daily. Disp: 180 tablet Rfl: 0   Polyethylene Glycol 3350 (MIRALAX) Oral Powder Take 17 g by mouth daily. Disp: 1 Bottle Rfl: 0   ASPIRIN EC LOW DOSE 81 MG Oral Tab EC Take 81 mg by mouth once daily.  Disp:  Rfl: 1   famoTIDine 20 MG Oral Tab Ta effects noted, poorly controlled, needs improvement, needs to follow diet more regularly. PLAN: will continue present medications, check fasting lipids and CMP, reviewed diet, exercise and weight control.  The patient indicates understanding of these issue

## 2018-02-06 NOTE — PATIENT INSTRUCTIONS
[address] (2 Gramos Al Día) [Low Salt Diet – 2 Grams/Day]  Esta dieta elimina los alimentos con alto contenido de sal y reduce (hace menos) la cantidad de sal que se puede usar para cocinar.  Esta dieta es usada para personas con johnnie presión ar EVITE: La mayoría de los pasteles, tortas y galletas preparadas or procesadas con lupe; pudín  Tray   SÍ: Toda carne fresca, pescado, princess y atún bajo en sal  EVITE: Carne, pescado o princess ahumados, encurtidos o preparados con sal; esto incluye el tocino EVITE: Evite la salsa de tomate (ketchup), los pepinos encurtidos (pickles), los condimentos encurtidos (relishes), las salsas chinas (soya y teriyaki), la salsa de barbacoa, salsa tártaro, salsas para ablandar la carne, salsa chili, y las salsas comunes p

## 2018-02-08 ENCOUNTER — APPOINTMENT (OUTPATIENT)
Dept: LAB | Age: 53
End: 2018-02-08
Attending: FAMILY MEDICINE
Payer: COMMERCIAL

## 2018-02-08 DIAGNOSIS — E78.00 HYPERCHOLESTEREMIA: ICD-10-CM

## 2018-02-08 DIAGNOSIS — E87.6 HYPOKALEMIA: ICD-10-CM

## 2018-02-08 DIAGNOSIS — E78.1 HYPERTRIGLYCERIDEMIA: ICD-10-CM

## 2018-02-08 DIAGNOSIS — R74.8 ELEVATED LIVER ENZYMES: ICD-10-CM

## 2018-02-08 LAB
ALBUMIN SERPL BCP-MCNC: 3.7 G/DL (ref 3.5–4.8)
ALP SERPL-CCNC: 95 U/L (ref 32–100)
ALT SERPL-CCNC: 35 U/L (ref 17–63)
ANION GAP SERPL CALC-SCNC: 10 MMOL/L (ref 0–18)
AST SERPL-CCNC: 32 U/L (ref 15–41)
BILIRUB DIRECT SERPL-MCNC: 0.1 MG/DL (ref 0–0.2)
BILIRUB SERPL-MCNC: 0.7 MG/DL (ref 0.3–1.2)
BUN SERPL-MCNC: 9 MG/DL (ref 8–20)
BUN/CREAT SERPL: 9 (ref 10–20)
CALCIUM SERPL-MCNC: 9.4 MG/DL (ref 8.5–10.5)
CHLORIDE SERPL-SCNC: 107 MMOL/L (ref 95–110)
CHOLEST SERPL-MCNC: 123 MG/DL (ref 110–200)
CO2 SERPL-SCNC: 25 MMOL/L (ref 22–32)
CREAT SERPL-MCNC: 1 MG/DL (ref 0.5–1.5)
GLUCOSE SERPL-MCNC: 98 MG/DL (ref 70–99)
HDLC SERPL-MCNC: 37 MG/DL
LDLC SERPL CALC-MCNC: 71 MG/DL (ref 0–99)
LDLC SERPL DIRECT ASSAY-MCNC: 75 MG/DL (ref 0–99)
NONHDLC SERPL-MCNC: 86 MG/DL
OSMOLALITY UR CALC.SUM OF ELEC: 293 MOSM/KG (ref 275–295)
POTASSIUM SERPL-SCNC: 3.8 MMOL/L (ref 3.3–5.1)
PROT SERPL-MCNC: 7 G/DL (ref 5.9–8.4)
SODIUM SERPL-SCNC: 142 MMOL/L (ref 136–144)
TRIGL SERPL-MCNC: 73 MG/DL (ref 1–149)

## 2018-02-08 PROCEDURE — 80061 LIPID PANEL: CPT

## 2018-02-08 PROCEDURE — 80048 BASIC METABOLIC PNL TOTAL CA: CPT

## 2018-02-08 PROCEDURE — 83721 ASSAY OF BLOOD LIPOPROTEIN: CPT

## 2018-02-08 PROCEDURE — 36415 COLL VENOUS BLD VENIPUNCTURE: CPT

## 2018-02-08 PROCEDURE — 80076 HEPATIC FUNCTION PANEL: CPT

## 2018-03-10 RX ORDER — HYDROCHLOROTHIAZIDE 25 MG/1
TABLET ORAL
Qty: 90 TABLET | Refills: 0 | Status: SHIPPED | OUTPATIENT
Start: 2018-03-10 | End: 2018-06-09

## 2018-04-11 RX ORDER — LISINOPRIL 40 MG/1
40 TABLET ORAL DAILY
Qty: 90 TABLET | Refills: 0 | Status: SHIPPED | OUTPATIENT
Start: 2018-04-11 | End: 2018-07-13

## 2018-04-11 RX ORDER — METOPROLOL TARTRATE 50 MG/1
50 TABLET, FILM COATED ORAL 2 TIMES DAILY
Qty: 180 TABLET | Refills: 0 | Status: SHIPPED | OUTPATIENT
Start: 2018-04-11 | End: 2018-07-13

## 2018-04-11 RX ORDER — AMLODIPINE BESYLATE 5 MG/1
5 TABLET ORAL DAILY
Qty: 90 TABLET | Refills: 0 | Status: SHIPPED | OUTPATIENT
Start: 2018-04-11 | End: 2018-07-13

## 2018-04-11 NOTE — TELEPHONE ENCOUNTER
Is pt taking his chol medication? It did pass protocol but he has not had it kasie long time?     Cholesterol Medications  Protocol Criteria:  · Appointment scheduled in the past 12 months or in the next 3 months  · ALT & LDL on file in the past 12 months  · A 02/08/2018   CA 9.4 02/08/2018   ALKPHOS 91 05/07/2016   AST 32 02/08/2018   ALT 35 02/08/2018   BILT 0.7 02/08/2018   TP 7.0 02/08/2018   ALB 3.7 02/08/2018    02/08/2018   K 3.8 02/08/2018    02/08/2018   CO2 25 02/08/2018   GLOBULIN 4.2 (H)

## 2018-04-12 NOTE — TELEPHONE ENCOUNTER
Staff=  Please call patient and clarify if she is taking the medication,last refilled last 6/27/17 with 3 months supply and nothing after that. [facility]

## 2018-04-14 RX ORDER — ATORVASTATIN CALCIUM 40 MG/1
TABLET, FILM COATED ORAL
Qty: 90 TABLET | Refills: 0 | Status: SHIPPED | OUTPATIENT
Start: 2018-04-14 | End: 2018-12-19

## 2018-04-14 NOTE — TELEPHONE ENCOUNTER
Pending Prescriptions Disp Refills    ATORVASTATIN 40 MG Oral Tab [Pharmacy Med Name: ATORVASTATIN CALCIUM 40MG TABLET] 90 tablet 0     Sig: TAKE 1 TABLET BY MOUTH ONCE NIGHTLY       Signed Prescriptions Disp Refills    METOPROLOL TARTRATE 50 MG Oral Tab

## 2018-06-10 RX ORDER — HYDROCHLOROTHIAZIDE 25 MG/1
TABLET ORAL
Qty: 90 TABLET | Refills: 0 | Status: SHIPPED | OUTPATIENT
Start: 2018-06-10 | End: 2018-09-10

## 2018-07-13 RX ORDER — METOPROLOL TARTRATE 50 MG/1
50 TABLET, FILM COATED ORAL 2 TIMES DAILY
Qty: 180 TABLET | Refills: 0 | Status: SHIPPED | OUTPATIENT
Start: 2018-07-13 | End: 2019-01-16

## 2018-07-13 RX ORDER — LISINOPRIL 40 MG/1
40 TABLET ORAL DAILY
Qty: 90 TABLET | Refills: 0 | Status: SHIPPED | OUTPATIENT
Start: 2018-07-13 | End: 2019-01-16

## 2018-07-13 RX ORDER — AMLODIPINE BESYLATE 5 MG/1
5 TABLET ORAL DAILY
Qty: 90 TABLET | Refills: 0 | Status: SHIPPED | OUTPATIENT
Start: 2018-07-13 | End: 2019-01-16

## 2018-08-21 ENCOUNTER — TELEPHONE (OUTPATIENT)
Dept: CASE MANAGEMENT | Age: 53
End: 2018-08-21

## 2018-08-21 NOTE — TELEPHONE ENCOUNTER
Patient notified that he is due for appt w/PCP & colorectal screening. Patient states that he is currently on vacation and will call back upon return.

## 2018-09-11 RX ORDER — HYDROCHLOROTHIAZIDE 25 MG/1
TABLET ORAL
Qty: 30 TABLET | Refills: 0 | Status: SHIPPED | OUTPATIENT
Start: 2018-09-11 | End: 2018-12-10

## 2018-09-24 ENCOUNTER — PATIENT OUTREACH (OUTPATIENT)
Dept: CASE MANAGEMENT | Age: 53
End: 2018-09-24

## 2018-09-24 NOTE — PROGRESS NOTES
Outreach to patient in regards to scheduling his Annual Px exam with his PCP. Pt is due at this time. Left message to call back ext. [phone]. Thank you.

## 2018-12-09 NOTE — ED PROVIDER NOTES
Patient Seen in: [facility] AND [facility] Emergency Department    History   Patient presents with:  Eval-P (psychiatric)    Stated Complaint: ETOH and Anxious presents with Wife and Son    HPI    Patient is a 81-year-old Divehi-speaking male who arrives with h BASIC METABOLIC PANEL (8)   HEPATIC FUNCTION PANEL (7)   CBC WITH DIFFERENTIAL WITH PLATELET   ETHYL ALCOHOL   RAINBOW DRAW BLUE   RAINBOW DRAW LAVENDER   RAINBOW DRAW DARK GREEN   RAINBOW DRAW LIGHT GREEN   RAINBOW DRAW GOLD   RAINBOW DRAW LAVENDER TALL

## 2018-12-09 NOTE — ED NOTES
Patient arrives for nausea and anxiety. Patient admits to ETOH, states he had \"10 beers\" today, however wife states he had \"24\". Patient is a/o x 3, Mosotho speaking. Denies SI/HI. Denies previous detox treatments. Denies seizure hx.

## 2018-12-09 NOTE — CM/SW NOTE
Spoke with patient's wife and son, as patient is Yemeni speaking, regarding alcohol detox facilities in their area. Resource handouts given to family with 24/7 Detox assistance phone numbers and the names of many detox facilities in the area.  EDCM also e

## 2018-12-09 NOTE — ED PROVIDER NOTES
Patient endorsed pending laboratory results and disposition. Intoxicated on laboratory results, otherwise unremarkable and at baseline. Patient in no distress on my evaluation, feeling well and family comfortable taking home at this point.   Patient was g

## 2018-12-13 RX ORDER — HYDROCHLOROTHIAZIDE 25 MG/1
TABLET ORAL
Qty: 30 TABLET | Refills: 0 | Status: SHIPPED | OUTPATIENT
Start: 2018-12-13 | End: 2018-12-19

## 2018-12-19 ENCOUNTER — APPOINTMENT (OUTPATIENT)
Dept: LAB | Age: 53
End: 2018-12-19
Attending: FAMILY MEDICINE
Payer: COMMERCIAL

## 2018-12-19 ENCOUNTER — OFFICE VISIT (OUTPATIENT)
Dept: FAMILY MEDICINE CLINIC | Facility: CLINIC | Age: 53
End: 2018-12-19

## 2018-12-19 VITALS
BODY MASS INDEX: 45.32 KG/M2 | WEIGHT: 272 LBS | SYSTOLIC BLOOD PRESSURE: 128 MMHG | HEIGHT: 65 IN | TEMPERATURE: 98 F | DIASTOLIC BLOOD PRESSURE: 80 MMHG | HEART RATE: 65 BPM

## 2018-12-19 DIAGNOSIS — R74.8 ELEVATED LIVER ENZYMES: ICD-10-CM

## 2018-12-19 DIAGNOSIS — E66.01 MORBID OBESITY WITH BMI OF 45.0-49.9, ADULT (HCC): ICD-10-CM

## 2018-12-19 DIAGNOSIS — E78.00 HYPERCHOLESTEREMIA: ICD-10-CM

## 2018-12-19 DIAGNOSIS — I10 ESSENTIAL HYPERTENSION: Primary | ICD-10-CM

## 2018-12-19 DIAGNOSIS — I10 ESSENTIAL HYPERTENSION: ICD-10-CM

## 2018-12-19 PROCEDURE — 80076 HEPATIC FUNCTION PANEL: CPT

## 2018-12-19 PROCEDURE — 99214 OFFICE O/P EST MOD 30 MIN: CPT | Performed by: FAMILY MEDICINE

## 2018-12-19 PROCEDURE — 90686 IIV4 VACC NO PRSV 0.5 ML IM: CPT | Performed by: FAMILY MEDICINE

## 2018-12-19 PROCEDURE — 36415 COLL VENOUS BLD VENIPUNCTURE: CPT

## 2018-12-19 PROCEDURE — 83036 HEMOGLOBIN GLYCOSYLATED A1C: CPT

## 2018-12-19 PROCEDURE — 80048 BASIC METABOLIC PNL TOTAL CA: CPT

## 2018-12-19 PROCEDURE — 99212 OFFICE O/P EST SF 10 MIN: CPT | Performed by: FAMILY MEDICINE

## 2018-12-19 PROCEDURE — 80061 LIPID PANEL: CPT

## 2018-12-19 PROCEDURE — 90471 IMMUNIZATION ADMIN: CPT | Performed by: FAMILY MEDICINE

## 2018-12-19 RX ORDER — AMOXICILLIN 875 MG/1
875 TABLET, COATED ORAL 2 TIMES DAILY
Qty: 20 TABLET | Refills: 0 | Status: SHIPPED | OUTPATIENT
Start: 2018-12-19 | End: 2018-12-29

## 2018-12-19 NOTE — PROGRESS NOTES
HPI:    Patient ID: Jenn Levin is a 47year old male. HPI     Patient here for follow up from ER  Seen 12/8/18    Patient endorsed pending laboratory results and disposition.   Intoxicated on laboratory results, otherwise unremarkable and at baseline reviewed.       All other systems reviewed and negative except as noted above.     Physical Exam          ED Triage Vitals [12/08/18 2349]   BP (!) 171/91   Pulse 85   Resp 16   Temp 98 °F (36.7 °C)   Temp src     SpO2 99 %   O2 Device           Current:BP light-headedness, numbness and headaches.          /80   Pulse 65   Temp 98.1 °F (36.7 °C) (Oral)   Ht 5' 5\" (1.651 m)   Wt 272 lb (123.4 kg)   BMI 45.26 kg/m²            Current Outpatient Medications:  amoxicillin 875 MG Oral Tab Take 1 tablet (875 enzymes    - HEPATIC FUNCTION PANEL (7); Future    4. Morbid obesity with BMI of 45.0-49.9, adult ([address]    Weight better   Highly recommend to lose weight. Discussed good dietary and eating habits as well as increasing vegetable and fruit intake.   Recomme

## 2018-12-21 DIAGNOSIS — R74.8 ELEVATED LIVER ENZYMES: Primary | ICD-10-CM

## 2018-12-21 DIAGNOSIS — E78.00 HYPERCHOLESTEREMIA: ICD-10-CM

## 2019-01-16 RX ORDER — METOPROLOL TARTRATE 50 MG/1
50 TABLET, FILM COATED ORAL 2 TIMES DAILY
Qty: 180 TABLET | Refills: 0 | Status: SHIPPED | OUTPATIENT
Start: 2019-01-16 | End: 2019-06-25

## 2019-01-16 RX ORDER — LISINOPRIL 40 MG/1
40 TABLET ORAL DAILY
Qty: 90 TABLET | Refills: 0 | Status: SHIPPED | OUTPATIENT
Start: 2019-01-16 | End: 2019-06-25

## 2019-01-16 RX ORDER — AMLODIPINE BESYLATE 5 MG/1
5 TABLET ORAL DAILY
Qty: 90 TABLET | Refills: 0 | Status: SHIPPED | OUTPATIENT
Start: 2019-01-16 | End: 2019-06-25

## 2019-01-16 RX ORDER — ATORVASTATIN CALCIUM 40 MG/1
TABLET, FILM COATED ORAL
Qty: 90 TABLET | Refills: 0 | OUTPATIENT
Start: 2019-01-16

## 2019-01-28 ENCOUNTER — HOSPITAL ENCOUNTER (OUTPATIENT)
Dept: ULTRASOUND IMAGING | Age: 54
Discharge: HOME | End: 2019-01-28
Attending: FAMILY MEDICINE
Payer: COMMERCIAL

## 2019-01-28 DIAGNOSIS — R74.8 ELEVATED LIVER ENZYMES: ICD-10-CM

## 2019-01-28 PROCEDURE — 76705 ECHO EXAM OF ABDOMEN: CPT | Performed by: FAMILY MEDICINE

## 2019-06-25 NOTE — TELEPHONE ENCOUNTER
Pt is calling for status of her medication refill request. Per pt he is out of medication and can be reached at [phone]. Per pt he is going out of the state this week.

## 2019-06-26 RX ORDER — LISINOPRIL 40 MG/1
40 TABLET ORAL DAILY
Qty: 30 TABLET | Refills: 0 | Status: SHIPPED | OUTPATIENT
Start: 2019-06-26 | End: 2019-11-18

## 2019-06-26 RX ORDER — AMLODIPINE BESYLATE 5 MG/1
5 TABLET ORAL DAILY
Qty: 30 TABLET | Refills: 0 | Status: SHIPPED | OUTPATIENT
Start: 2019-06-26 | End: 2019-11-18

## 2019-06-26 RX ORDER — METOPROLOL TARTRATE 50 MG/1
50 TABLET, FILM COATED ORAL 2 TIMES DAILY
Qty: 60 TABLET | Refills: 0 | Status: SHIPPED | OUTPATIENT
Start: 2019-06-26 | End: 2019-11-18

## 2019-10-24 ENCOUNTER — TELEPHONE (OUTPATIENT)
Dept: CASE MANAGEMENT | Age: 54
End: 2019-10-24

## 2019-10-24 NOTE — TELEPHONE ENCOUNTER
Patient is due for follow up visit with PCP and colorectal cancer screening. Left message to call back [phone].

## 2019-11-18 ENCOUNTER — APPOINTMENT (OUTPATIENT)
Dept: MRI IMAGING | Facility: HOSPITAL | Age: 54
End: 2019-11-18
Attending: EMERGENCY MEDICINE
Payer: COMMERCIAL

## 2019-11-18 ENCOUNTER — APPOINTMENT (OUTPATIENT)
Dept: GENERAL RADIOLOGY | Facility: HOSPITAL | Age: 54
End: 2019-11-18
Attending: EMERGENCY MEDICINE
Payer: COMMERCIAL

## 2019-11-18 ENCOUNTER — NURSE TRIAGE (OUTPATIENT)
Dept: FAMILY MEDICINE CLINIC | Facility: CLINIC | Age: 54
End: 2019-11-18

## 2019-11-18 PROCEDURE — 70551 MRI BRAIN STEM W/O DYE: CPT | Performed by: EMERGENCY MEDICINE

## 2019-11-18 PROCEDURE — 71045 X-RAY EXAM CHEST 1 VIEW: CPT | Performed by: EMERGENCY MEDICINE

## 2019-11-18 NOTE — ED NOTES
PT safe to DC home per MD. Sabiha Salazar to dress self. DC teaching done, pt verbalizes understanding. Requested script for ativan, per Antonio Cavazos MD pt needs to get from his primary MD at follow up. Ambulatory with steady gait to exit.

## 2019-11-18 NOTE — PROGRESS NOTES
Patient presents with:  ER F/U: f/u for alcohol dependence; pt requesting medication refills for anxiety     Of note, patient birthdate has been reported as 02/08/1965. This is incorrect. His actual birthdate of 01/08/1965.     HPI:   Joaquina Pyle is a are clear  NECK: supple, no adenopathy  LUNGS: clear to auscultation  CARDIO: RRR without murmur  GI: good BS, no masses or tenderness  NEURO: Oriented times three, cranial nerves are intact, motor and sensory are grossly intact    ASSESSMENT AND PLAN:

## 2019-11-18 NOTE — TELEPHONE ENCOUNTER
Patients wife asking if he can get medication refill.  suffers from anxiety and shakes. States he cant sleep.  Was in Er last night    Ku[place]i speaking  transferring to triage

## 2019-11-18 NOTE — ED PROVIDER NOTES
Patient Seen in: [facility] AND [facility] Emergency Department      History   Patient presents with:  Dizziness (neurologic)    Stated Complaint:     HPI    Patient is a 79-year-old male with a history of hypertension and anxiety who states he has not felt well ear normal.   Left Ear: External ear normal.   Nose: Nose normal.   Mouth/Throat: Oropharynx is clear and moist.   Eyes: Conjunctivae and EOM are normal. Pupils are equal, round, and reactive to light. Neck: Neck supple.    Cardiovascular: Normal rate, re Abnormal            Final result                 Please view results for these tests on the individual orders.    RAINBOW DRAW BLUE   RAINBOW DRAW LAVENDER   RAINBOW DRAW LIGHT GREEN   RAINBOW DRAW GOLD     EKG    Rate, intervals and axes as noted on EKG Re

## 2019-11-18 NOTE — ED INITIAL ASSESSMENT (HPI)
PT states he has been ceased drinking at 0000, feeling withdrawal symptoms, +weakness,  Dizziness, numbness to face.

## 2019-11-18 NOTE — TELEPHONE ENCOUNTER
Action Requested: Summary for Provider     []  Critical Lab, Recommendations Needed  [] Need Additional Advice  []   FYI    []   Need Orders  [] Need Medications Sent to Pharmacy  []  Other     SUMMARY: Patient was seen in the ER today for anxiety related

## 2019-11-18 NOTE — TELEPHONE ENCOUNTER
Reason for Disposition  • Drinks alcohol daily and prior DTs (delirium tremens)    Protocols used: ALCOHOL ABUSE AND PPBEAPGCMD-C-FL

## 2019-11-18 NOTE — ED NOTES
Pt ambulatory into triage, initially complaining of chest pain, but later admitted that he has no cp, but rather does not feel well d/t alcoholism. Pt c/o headache, weakness, insomnia, and  rtfacial numbness for several days.  Pt admits to drinking 12-25 be

## 2019-12-02 ENCOUNTER — OFFICE VISIT (OUTPATIENT)
Dept: FAMILY MEDICINE CLINIC | Facility: CLINIC | Age: 54
End: 2019-12-02

## 2019-12-02 VITALS
DIASTOLIC BLOOD PRESSURE: 86 MMHG | BODY MASS INDEX: 41.59 KG/M2 | TEMPERATURE: 98 F | SYSTOLIC BLOOD PRESSURE: 138 MMHG | HEART RATE: 98 BPM | HEIGHT: 67 IN | WEIGHT: 265 LBS

## 2019-12-02 DIAGNOSIS — H93.13 TINNITUS OF BOTH EARS: Primary | ICD-10-CM

## 2019-12-02 DIAGNOSIS — H54.3 VISION LOSS, BILATERAL: ICD-10-CM

## 2019-12-02 PROCEDURE — 99214 OFFICE O/P EST MOD 30 MIN: CPT | Performed by: FAMILY MEDICINE

## 2019-12-02 RX ORDER — MECLIZINE HYDROCHLORIDE 25 MG/1
25 TABLET ORAL 3 TIMES DAILY PRN
Qty: 30 TABLET | Refills: 3 | Status: ON HOLD | OUTPATIENT
Start: 2019-12-02 | End: 2020-01-01

## 2019-12-02 NOTE — PROGRESS NOTES
Patient presents with:  Ear Problem: c/o bilateral buzzing sound worse in left ear and noticed blood coming from right ear.      HPI:   Irvin Vasquez is a 49year old male who presents to clinic with complaints of bilateral tinnitus and blood on Q-tip afte needed. Dispense: 30 tablet; Refill: 3  - ENT - INTERNAL. Advised patient to see ENT if tinnitus worsens or bleeding recurs. 2. Vision loss, bilateral  - OPTOMETRY - INTERNAL    RTC if no improvement in symptoms. Red flags discussed to go to ER.

## 2020-01-01 ENCOUNTER — APPOINTMENT (OUTPATIENT)
Dept: ULTRASOUND IMAGING | Facility: HOSPITAL | Age: 55
DRG: 896 | End: 2020-01-01
Attending: HOSPITALIST
Payer: COMMERCIAL

## 2020-01-01 ENCOUNTER — HOSPITAL ENCOUNTER (INPATIENT)
Facility: HOSPITAL | Age: 55
LOS: 2 days | Discharge: HOME | DRG: 896 | End: 2020-01-01
Attending: EMERGENCY MEDICINE | Admitting: HOSPITALIST
Payer: COMMERCIAL

## 2020-01-01 ENCOUNTER — NURSE TRIAGE (OUTPATIENT)
Dept: FAMILY MEDICINE CLINIC | Facility: CLINIC | Age: 55
End: 2020-01-01

## 2020-01-01 ENCOUNTER — PATIENT OUTREACH (OUTPATIENT)
Dept: CASE MANAGEMENT | Age: 55
End: 2020-01-01

## 2020-01-01 ENCOUNTER — APPOINTMENT (OUTPATIENT)
Dept: CV DIAGNOSTICS | Facility: HOSPITAL | Age: 55
DRG: 896 | End: 2020-01-01
Attending: NURSE PRACTITIONER
Payer: COMMERCIAL

## 2020-01-01 VITALS
TEMPERATURE: 98 F | RESPIRATION RATE: 20 BRPM | HEART RATE: 76 BPM | HEIGHT: 67 IN | WEIGHT: 290.31 LBS | DIASTOLIC BLOOD PRESSURE: 88 MMHG | OXYGEN SATURATION: 95 % | BODY MASS INDEX: 45.56 KG/M2 | SYSTOLIC BLOOD PRESSURE: 147 MMHG

## 2020-01-01 DIAGNOSIS — F10.230 ALCOHOL WITHDRAWAL SYNDROME WITHOUT COMPLICATION (HCC): ICD-10-CM

## 2020-01-01 DIAGNOSIS — I21.4 NSTEMI (NON-ST ELEVATED MYOCARDIAL INFARCTION) (HCC): Primary | ICD-10-CM

## 2020-01-01 DIAGNOSIS — Z02.9 ENCOUNTERS FOR ADMINISTRATIVE PURPOSE: ICD-10-CM

## 2020-01-01 DIAGNOSIS — I21.4 NSTEMI (NON-ST ELEVATED MYOCARDIAL INFARCTION) (HCC): ICD-10-CM

## 2020-01-01 PROCEDURE — 93306 TTE W/DOPPLER COMPLETE: CPT | Performed by: NURSE PRACTITIONER

## 2020-01-01 PROCEDURE — 3079F DIAST BP 80-89 MM HG: CPT | Performed by: HOSPITALIST

## 2020-01-01 PROCEDURE — 3008F BODY MASS INDEX DOCD: CPT | Performed by: HOSPITALIST

## 2020-01-01 PROCEDURE — 99223 1ST HOSP IP/OBS HIGH 75: CPT | Performed by: HOSPITALIST

## 2020-01-01 PROCEDURE — 76705 ECHO EXAM OF ABDOMEN: CPT | Performed by: HOSPITALIST

## 2020-01-01 PROCEDURE — 3077F SYST BP >= 140 MM HG: CPT | Performed by: HOSPITALIST

## 2020-01-01 PROCEDURE — 99233 SBSQ HOSP IP/OBS HIGH 50: CPT | Performed by: HOSPITALIST

## 2020-01-01 PROCEDURE — 99239 HOSP IP/OBS DSCHRG MGMT >30: CPT | Performed by: HOSPITALIST

## 2020-01-01 PROCEDURE — 1111F DSCHRG MED/CURRENT MED MERGE: CPT

## 2020-01-01 RX ORDER — LORAZEPAM 1 MG/1
2 TABLET ORAL
Status: DISCONTINUED | OUTPATIENT
Start: 2020-01-01 | End: 2020-01-01

## 2020-01-01 RX ORDER — LORAZEPAM 2 MG/ML
2 INJECTION INTRAMUSCULAR ONCE
Status: COMPLETED | OUTPATIENT
Start: 2020-01-01 | End: 2020-01-01

## 2020-01-01 RX ORDER — HEPARIN SODIUM 5000 [USP'U]/ML
5000 INJECTION, SOLUTION INTRAVENOUS; SUBCUTANEOUS EVERY 12 HOURS SCHEDULED
Status: DISCONTINUED | OUTPATIENT
Start: 2020-01-01 | End: 2020-01-01

## 2020-01-01 RX ORDER — DOXEPIN HYDROCHLORIDE 50 MG/1
1 CAPSULE ORAL DAILY
Status: DISCONTINUED | OUTPATIENT
Start: 2020-01-01 | End: 2020-01-01

## 2020-01-01 RX ORDER — LORAZEPAM 1 MG/1
1 TABLET ORAL
Status: DISCONTINUED | OUTPATIENT
Start: 2020-01-01 | End: 2020-01-01

## 2020-01-01 RX ORDER — ONDANSETRON 2 MG/ML
4 INJECTION INTRAMUSCULAR; INTRAVENOUS EVERY 6 HOURS PRN
Status: DISCONTINUED | OUTPATIENT
Start: 2020-01-01 | End: 2020-01-01

## 2020-01-01 RX ORDER — ASPIRIN 81 MG/1
324 TABLET, CHEWABLE ORAL ONCE
Status: COMPLETED | OUTPATIENT
Start: 2020-01-01 | End: 2020-01-01

## 2020-01-01 RX ORDER — POTASSIUM CHLORIDE 20 MEQ/1
40 TABLET, EXTENDED RELEASE ORAL EVERY 4 HOURS
Status: COMPLETED | OUTPATIENT
Start: 2020-01-01 | End: 2020-01-01

## 2020-01-01 RX ORDER — METOPROLOL TARTRATE 50 MG/1
50 TABLET, FILM COATED ORAL 2 TIMES DAILY
Status: DISCONTINUED | OUTPATIENT
Start: 2020-01-01 | End: 2020-01-01

## 2020-01-01 RX ORDER — SODIUM CHLORIDE 9 MG/ML
1000 INJECTION, SOLUTION INTRAVENOUS ONCE
Status: COMPLETED | OUTPATIENT
Start: 2020-01-01 | End: 2020-01-01

## 2020-01-01 RX ORDER — LORAZEPAM 2 MG/ML
2 INJECTION INTRAMUSCULAR
Status: DISCONTINUED | OUTPATIENT
Start: 2020-01-01 | End: 2020-01-01

## 2020-01-01 RX ORDER — ATORVASTATIN CALCIUM 40 MG/1
40 TABLET, FILM COATED ORAL NIGHTLY
Status: DISCONTINUED | OUTPATIENT
Start: 2020-01-01 | End: 2020-01-01

## 2020-01-01 RX ORDER — ASPIRIN 81 MG/1
81 TABLET, CHEWABLE ORAL DAILY
Status: DISCONTINUED | OUTPATIENT
Start: 2020-01-01 | End: 2020-01-01

## 2020-01-01 RX ORDER — LORAZEPAM 2 MG/ML
1 INJECTION INTRAMUSCULAR
Status: DISCONTINUED | OUTPATIENT
Start: 2020-01-01 | End: 2020-01-01

## 2020-01-01 RX ORDER — LISINOPRIL 10 MG/1
10 TABLET ORAL DAILY
Status: DISCONTINUED | OUTPATIENT
Start: 2020-01-01 | End: 2020-01-01

## 2020-01-01 RX ORDER — MELATONIN
100 DAILY
Status: DISCONTINUED | OUTPATIENT
Start: 2020-01-01 | End: 2020-01-01

## 2020-01-01 RX ORDER — DIAZEPAM 5 MG/1
TABLET ORAL
Qty: 12 TABLET | Refills: 0 | Status: SHIPPED | OUTPATIENT
Start: 2020-01-01 | End: 2020-01-01

## 2020-01-01 RX ORDER — FOLIC ACID 1 MG/1
1 TABLET ORAL DAILY
Status: DISCONTINUED | OUTPATIENT
Start: 2020-01-01 | End: 2020-01-01

## 2020-01-01 RX ORDER — NITROGLYCERIN 0.4 MG/1
0.4 TABLET SUBLINGUAL EVERY 5 MIN PRN
Status: DISCONTINUED | OUTPATIENT
Start: 2020-01-01 | End: 2020-01-01

## 2020-01-01 RX ORDER — MULTIPLE VITAMINS W/ MINERALS TAB 9MG-400MCG
1 TAB ORAL DAILY
Status: DISCONTINUED | OUTPATIENT
Start: 2020-01-01 | End: 2020-01-01

## 2020-02-10 DIAGNOSIS — I10 ESSENTIAL HYPERTENSION: ICD-10-CM

## 2020-02-11 RX ORDER — METOPROLOL TARTRATE 50 MG/1
50 TABLET, FILM COATED ORAL 2 TIMES DAILY
Qty: 180 TABLET | Refills: 1 | Status: SHIPPED | OUTPATIENT
Start: 2020-02-11 | End: 2020-02-28

## 2020-02-11 RX ORDER — LISINOPRIL 40 MG/1
40 TABLET ORAL DAILY
Qty: 90 TABLET | Refills: 1 | Status: SHIPPED | OUTPATIENT
Start: 2020-02-11 | End: 2020-02-28

## 2020-02-11 RX ORDER — AMLODIPINE BESYLATE 5 MG/1
5 TABLET ORAL DAILY
Qty: 90 TABLET | Refills: 1 | Status: SHIPPED | OUTPATIENT
Start: 2020-02-11 | End: 2020-02-28

## 2020-02-11 NOTE — TELEPHONE ENCOUNTER
Refill passed per [facility] protocol.     Hypertensive Medications  Protocol Criteria:  · Appointment scheduled in the past 6 months or in the next 3 months  · BMP or CMP in the past 12 months  · Creatinine result < 2  Recent Outpatient Visits

## 2020-02-28 ENCOUNTER — LAB ENCOUNTER (OUTPATIENT)
Dept: LAB | Age: 55
End: 2020-02-28
Attending: FAMILY MEDICINE
Payer: COMMERCIAL

## 2020-02-28 ENCOUNTER — OFFICE VISIT (OUTPATIENT)
Dept: FAMILY MEDICINE CLINIC | Facility: CLINIC | Age: 55
End: 2020-02-28

## 2020-02-28 VITALS
TEMPERATURE: 98 F | SYSTOLIC BLOOD PRESSURE: 130 MMHG | DIASTOLIC BLOOD PRESSURE: 83 MMHG | WEIGHT: 287 LBS | HEART RATE: 60 BPM | HEIGHT: 67 IN | BODY MASS INDEX: 45.04 KG/M2

## 2020-02-28 DIAGNOSIS — Z00.00 ANNUAL PHYSICAL EXAM: Primary | ICD-10-CM

## 2020-02-28 DIAGNOSIS — Z00.00 ANNUAL PHYSICAL EXAM: ICD-10-CM

## 2020-02-28 DIAGNOSIS — M17.10 ARTHRITIS OF KNEE: ICD-10-CM

## 2020-02-28 DIAGNOSIS — I10 ESSENTIAL HYPERTENSION: ICD-10-CM

## 2020-02-28 LAB
ALBUMIN SERPL-MCNC: 3.7 G/DL (ref 3.4–5)
ALBUMIN/GLOB SERPL: 0.9 {RATIO} (ref 1–2)
ALP LIVER SERPL-CCNC: 118 U/L (ref 45–117)
ALT SERPL-CCNC: 39 U/L (ref 16–61)
ANION GAP SERPL CALC-SCNC: 6 MMOL/L (ref 0–18)
AST SERPL-CCNC: 25 U/L (ref 15–37)
BASOPHILS # BLD AUTO: 0.05 X10(3) UL (ref 0–0.2)
BASOPHILS NFR BLD AUTO: 0.7 %
BILIRUB SERPL-MCNC: 0.4 MG/DL (ref 0.1–2)
BUN BLD-MCNC: 23 MG/DL (ref 7–18)
BUN/CREAT SERPL: 21.3 (ref 10–20)
CALCIUM BLD-MCNC: 9.5 MG/DL (ref 8.5–10.1)
CHLORIDE SERPL-SCNC: 109 MMOL/L (ref 98–112)
CHOLEST SMN-MCNC: 222 MG/DL (ref ?–200)
CO2 SERPL-SCNC: 25 MMOL/L (ref 21–32)
COMPLEXED PSA SERPL-MCNC: 0.44 NG/ML (ref ?–4)
CREAT BLD-MCNC: 1.08 MG/DL (ref 0.7–1.3)
DEPRECATED RDW RBC AUTO: 41.2 FL (ref 35.1–46.3)
EOSINOPHIL # BLD AUTO: 0.19 X10(3) UL (ref 0–0.7)
EOSINOPHIL NFR BLD AUTO: 2.7 %
ERYTHROCYTE [DISTWIDTH] IN BLOOD BY AUTOMATED COUNT: 12.9 % (ref 11–15)
EST. AVERAGE GLUCOSE BLD GHB EST-MCNC: 100 MG/DL (ref 68–126)
GLOBULIN PLAS-MCNC: 4.1 G/DL (ref 2.8–4.4)
GLUCOSE BLD-MCNC: 92 MG/DL (ref 70–99)
HBA1C MFR BLD HPLC: 5.1 % (ref ?–5.7)
HCT VFR BLD AUTO: 41.2 % (ref 39–53)
HDLC SERPL-MCNC: 49 MG/DL (ref 40–59)
HGB BLD-MCNC: 14 G/DL (ref 13–17.5)
IMM GRANULOCYTES # BLD AUTO: 0.02 X10(3) UL (ref 0–1)
IMM GRANULOCYTES NFR BLD: 0.3 %
LDLC SERPL CALC-MCNC: 154 MG/DL (ref ?–100)
LYMPHOCYTES # BLD AUTO: 3.14 X10(3) UL (ref 1–4)
LYMPHOCYTES NFR BLD AUTO: 43.9 %
M PROTEIN MFR SERPL ELPH: 7.8 G/DL (ref 6.4–8.2)
MCH RBC QN AUTO: 29.9 PG (ref 26–34)
MCHC RBC AUTO-ENTMCNC: 34 G/DL (ref 31–37)
MCV RBC AUTO: 88 FL (ref 80–100)
MONOCYTES # BLD AUTO: 0.55 X10(3) UL (ref 0.1–1)
MONOCYTES NFR BLD AUTO: 7.7 %
NEUTROPHILS # BLD AUTO: 3.21 X10 (3) UL (ref 1.5–7.7)
NEUTROPHILS # BLD AUTO: 3.21 X10(3) UL (ref 1.5–7.7)
NEUTROPHILS NFR BLD AUTO: 44.7 %
NONHDLC SERPL-MCNC: 173 MG/DL (ref ?–130)
OSMOLALITY SERPL CALC.SUM OF ELEC: 293 MOSM/KG (ref 275–295)
PATIENT FASTING Y/N/NP: YES
PATIENT FASTING Y/N/NP: YES
PLATELET # BLD AUTO: 206 10(3)UL (ref 150–450)
POTASSIUM SERPL-SCNC: 3.7 MMOL/L (ref 3.5–5.1)
RBC # BLD AUTO: 4.68 X10(6)UL (ref 4.3–5.7)
SODIUM SERPL-SCNC: 140 MMOL/L (ref 136–145)
TRIGL SERPL-MCNC: 97 MG/DL (ref 30–149)
TSI SER-ACNC: 2.83 MIU/ML (ref 0.36–3.74)
VLDLC SERPL CALC-MCNC: 19 MG/DL (ref 0–30)
WBC # BLD AUTO: 7.2 X10(3) UL (ref 4–11)

## 2020-02-28 PROCEDURE — 84443 ASSAY THYROID STIM HORMONE: CPT

## 2020-02-28 PROCEDURE — 85025 COMPLETE CBC W/AUTO DIFF WBC: CPT

## 2020-02-28 PROCEDURE — 99396 PREV VISIT EST AGE 40-64: CPT | Performed by: FAMILY MEDICINE

## 2020-02-28 PROCEDURE — 83036 HEMOGLOBIN GLYCOSYLATED A1C: CPT

## 2020-02-28 PROCEDURE — 80053 COMPREHEN METABOLIC PANEL: CPT

## 2020-02-28 PROCEDURE — 80061 LIPID PANEL: CPT

## 2020-02-28 PROCEDURE — 36415 COLL VENOUS BLD VENIPUNCTURE: CPT

## 2020-02-28 RX ORDER — AMLODIPINE BESYLATE 5 MG/1
5 TABLET ORAL DAILY
Qty: 90 TABLET | Refills: 1 | Status: SHIPPED | OUTPATIENT
Start: 2020-02-28 | End: 2020-01-01

## 2020-02-28 RX ORDER — METOPROLOL TARTRATE 50 MG/1
50 TABLET, FILM COATED ORAL 2 TIMES DAILY
Qty: 180 TABLET | Refills: 1 | Status: SHIPPED | OUTPATIENT
Start: 2020-02-28 | End: 2020-01-01

## 2020-02-28 RX ORDER — LISINOPRIL 40 MG/1
40 TABLET ORAL DAILY
Qty: 90 TABLET | Refills: 1 | Status: SHIPPED | OUTPATIENT
Start: 2020-02-28 | End: 2020-01-01

## 2020-02-28 NOTE — PROGRESS NOTES
Patient presents with:  Physical    [place]  is a 48year old male who presents for a complete physical exam.   HPI:   Work: Currently working a couple times a week at [employer]. 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    Social: Lives with drinking 1 week     Drug use: No          REVIEW OF SYSTEMS:   GENERAL: feels well otherwise  SKIN: denies any skin lesions  EYES:denies blurred vision   HEENT: denies nasal congestion. Tinnitus resolved.   LUNGS: denies shortness of breath with exertion Annie Lester MD  2/28/2020  10:01 AM

## 2020-03-02 ENCOUNTER — TELEPHONE (OUTPATIENT)
Dept: FAMILY MEDICINE CLINIC | Facility: CLINIC | Age: 55
End: 2020-03-02

## 2020-03-02 DIAGNOSIS — E78.00 HYPERCHOLESTEREMIA: Primary | ICD-10-CM

## 2020-03-02 RX ORDER — ATORVASTATIN CALCIUM 40 MG/1
40 TABLET, FILM COATED ORAL NIGHTLY
Qty: 60 TABLET | Refills: 3 | Status: SHIPPED | OUTPATIENT
Start: 2020-03-02 | End: 2020-01-01

## 2020-03-05 DIAGNOSIS — M17.10 ARTHRITIS OF KNEE: ICD-10-CM

## 2020-03-07 ENCOUNTER — TELEPHONE (OUTPATIENT)
Dept: SURGERY | Facility: CLINIC | Age: 55
End: 2020-03-07

## 2020-03-07 NOTE — TELEPHONE ENCOUNTER
Advised patient of Dr Reyes Triana note. Patient verbalized understanding and had no further questions.

## 2020-03-07 NOTE — TELEPHONE ENCOUNTER
LMTCB for results on Hm # listed, tried the wk # listed and s/w dtr who stated he was not available but I had no NEGRITO on file to give her results and she will give pt msg to c/b.

## 2020-03-07 NOTE — TELEPHONE ENCOUNTER
Review pended refill request as it does not fall under a protocol.     Last Rx: 2/28/20  LOV: 2/28/20      Requested Prescriptions   Pending Prescriptions Disp Refills   • DICLOFENAC SODIUM 1 % Transdermal Gel [Pharmacy Med Name: DICLOFENAC SODIUM 1% GEL]

## 2020-03-07 NOTE — TELEPHONE ENCOUNTER
----- Message from Jason Sutton MD sent at 3/2/2020  2:26 PM CST -----  Results reviewed.  Please inform patient that all of his blood work looks normal, his prostate, thyroid, blood counts and kidney function look normal his liver function also looks n

## 2020-12-03 PROBLEM — F10.930 ALCOHOL WITHDRAWAL SYNDROME WITHOUT COMPLICATION (HCC): Status: ACTIVE | Noted: 2020-01-01

## 2020-12-03 PROBLEM — F10.230 ALCOHOL WITHDRAWAL SYNDROME WITHOUT COMPLICATION (HCC): Status: ACTIVE | Noted: 2020-01-01

## 2020-12-03 PROBLEM — I21.4 NSTEMI (NON-ST ELEVATED MYOCARDIAL INFARCTION) (HCC): Status: ACTIVE | Noted: 2020-01-01

## 2020-12-03 LAB
CHOLEST SERPL-MCNC: 268 MG/DL
HDLC SERPL-MCNC: 47 MG/DL
LDLC SERPL CALC-MCNC: 190 MG/DL
NONHDLC SERPL-MCNC: 221 MG/DL
TRIGL SERPL-MCNC: 154 MG/DL
VLDLC SERPL CALC-MCNC: 31 MG/DL

## 2020-12-03 NOTE — ED PROVIDER NOTES
Patient Seen in: [facility]      History   Patient presents with:  Weakness  Alcohol Intoxication    Stated Complaint:  CONGESTION SHAKEY,SAID VERY SICK    HPI    Pt had 15 beers yesterday and today feel very shaky and weak and anxious.  He de Musculoskeletal: Normal range of motion and neck supple. Cardiovascular:      Rate and Rhythm: Normal rate and regular rhythm. Pulses: Normal pulses. Heart sounds: Normal heart sounds.    Pulmonary:      Effort: Pulmonary effort is normal.

## 2020-12-03 NOTE — ED INITIAL ASSESSMENT (HPI)
Pt states he drinks 15 beers daily. Pt states this morning he has had 4 beers. Pt spoke with Dr. Newberry [facility] office today and was advise to go to the ED. Pt states he didn't feel well so he drove here from home. Pt states he lives 5 minutes from here.   Pt i

## 2020-12-03 NOTE — TELEPHONE ENCOUNTER
Action Requested: Summary for Provider     []  Critical Lab, Recommendations Needed  [x] Need Additional Advice  []   FYI    []   Need Orders  [] Need Medications Sent to Pharmacy  []  Other     SUMMARY: With , patient stated that on 11/

## 2020-12-04 NOTE — PROGRESS NOTES
[facility] [facility]  Hospitalist Progress Note     Ace Daniel Patient Status:  Inpatient      48year old CSN [id number]   Location 312/-A Attending Griffin Carter MD   Hosp Day # 1 PCP Nena Rosales MD     ASSESSMENT/PLAN    Non-ST GFRAA 102 113   GFRNAA 88 98   CA 8.2* 8.3*   ALB 3.2* 3.0*    138   K 3.6 3.3*    105   CO2 25.0 25.0   ALKPHO 109 95   * 225*   ALT 75* 67*   BILT 1.9 2.1*   TP 8.7* 7.8     Recent Labs   Lab 12/04/20  0043 12/04/20  0357   INR  --

## 2020-12-04 NOTE — PROGRESS NOTES
Cardiology addendum    [facility] 2017 EF 70% LM, LAD, Ramus normal.  Circumflex and RCA 25% stenosis - Dr. Camilla Aparicio. Echo pending - home if normal with outpatient follow up    Ivania Hutchinson NP    Echo EF 69% no WMA mild-moderate MR. See Dr. Loida Canas as outpt.   Richad Sheets

## 2020-12-04 NOTE — ED INITIAL ASSESSMENT (HPI)
Patient arrived with wife for alcohol withdrawal. States he drinks 15-24 beers per day and his last drink was yesterday. Patient states he feels anxious and slight chest pain.  Wife states she noticed he had tremors that began earlier today with poor appeti

## 2020-12-04 NOTE — PROGRESS NOTES
ADMISSION NOTE    48year old male with chronic EtOH abuse  presents with withdrawal symptoms and chest pain. Troponin 0.08, LFTs elevated, alcohol 145 . Available medical records partially reviewed. Dictation to follow.     Diana Lara M.D.  12/3/

## 2020-12-04 NOTE — CONSULTS
[facility] Cardiology Consult Note    Milli Cano Patient Status:  Inpatient    1965 MRN [id number]   Location [facility] 3W/SW Attending Kenneth Leiva MD   Hosp Day # 1 PCP Nena Rosales MD     48year old male, consulted for NSTEMI  · Isch Relation Age of Onset   • Hypertension Mother    • Hypertension Sister    • Heart Disorder Sister    • Diabetes Sister    • Cancer Neg       reports that he has never smoked. He has never used smokeless tobacco. He reports current alcohol use.  He reports t

## 2020-12-04 NOTE — PLAN OF CARE
VS stable. Denies any chest pain. Language Line Francisca Almonte #[id number]) used to communicate with patient as patient is primarily Mohawk speaking. Plan of care and safety precautions discussed with patient and verbalizes understanding. CIWA per order.  Ativan PRN g heart rate control medications as ordered  - Initiate emergency measures for life threatening arrhythmias  - Monitor electrolytes and administer replacement therapy as ordered  12/4/2020 1519 by Henrique Husain RN  Outcome: Progressing  12/4/2020 1517 by Patient/Family is able to understand and participate in their care  Description: Interventions:  - Assess communication ability and preferred communication style  - Implement communication aides and strategies  - Use visual cues when possible  - Listen att

## 2020-12-04 NOTE — PROGRESS NOTES
[facility] Pharmacy Note: Route Optimization for Multivitamin, Thiamine, Folic Acid    Vivian Luna is currently on multivitamin, thiamine 168 mg and folic acid 1 mg IV every 24 hours.   The patient meets the criteria to convert to the oral equivalent as establi

## 2020-12-04 NOTE — H&P
[facility]    PATIENT'S NAME: April Wetzel PHYSICIAN: Alexus Muir MD   PATIENT ACCOUNT#:   [id number]    LOCATION:  [address] [place] [address] #:   [id number]       YOB: 1965  ADMISSION DATE:       12/ seizures, CVA, or syncope. There are otherwise no additional pertinent positives or negatives on the 12-point review of systems except as listed in the History of Present Illness.       PHYSICAL EXAMINATION:    GENERAL:  He is a well-developed, well-nouris indicated that the patient had an ultrasound of his liver done in January 2019 which revealed an essentially normal exam of liver. EKG revealed no acute changes. ASSESSMENT AND PLAN:    1. Alcohol dependence.   For the first time, patient has come

## 2020-12-04 NOTE — PLAN OF CARE
Pt. admitted to ED with weakness, tremors, anxiousness, slight CP. Alcohol withdrawal. CIWA continued. Ativan given. Will continue to monitor.       Problem: Patient Centered Care  Goal: Patient preferences are identified and integrated in the patient's ira evaluate response  - Consider cultural and social influences on pain and pain management  - Manage/alleviate anxiety  - Utilize distraction and/or relaxation techniques  - Monitor for opioid side effects  - Notify MD/LIP if interventions unsuccessful or pa

## 2020-12-04 NOTE — ED PROVIDER NOTES
Patient Seen in: [facility] AND [facility] Emergency Department      History   Patient presents with:  Ava-VEL    Stated Complaint: ETOH    HPI    52-year-old male presents for evaluation for alcohol withdrawal.  Patient states that he typically drinks 15-25 beer cm (5' 7\")   Wt 122.5 kg   SpO2 90%   BMI 42.29 kg/m²         Physical Exam  Vitals signs and nursing note reviewed. Constitutional:       Appearance: He is well-developed. HENT:      Head: Normocephalic and atraumatic.    Eyes:      General: Lids are components:     (*)     ALT 75 (*)     Bilirubin, Direct 0.8 (*)     Total Protein 8.7 (*)     Albumin 3.2 (*)     All other components within normal limits   URINALYSIS WITH CULTURE REFLEX - Abnormal; Notable for the following components:    Blood consistent with alcohol abuse. CIWA was 7. Patient denies alcohol today however his level was slightly elevated. He was given some Ativan. Troponin came back elevated. EKG without acute ischemic changes. He is currently pain-free.   [facility] cardiology was

## 2020-12-05 LAB
BUN SERPL-MCNC: 17 MG/DL
BUN/CREAT SERPL: 13.9
CALCIUM SERPL-MCNC: 9.2 MG/DL
CHLORIDE SERPL-SCNC: 106 MMOL/L
CO2 SERPL-SCNC: 23 MMOL/L
CREAT SERPL-MCNC: 1.22 MG/DL
GLUCOSE SERPL-MCNC: 142 MG/DL
HCT VFR BLD CALC: 37.5 %
HGB BLD-MCNC: 13 G/DL
PLATELET # BLD: 61 K/MCL
POTASSIUM SERPL-SCNC: 4 MMOL/L
RBC # BLD: 3.98 10*6/UL
SODIUM SERPL-SCNC: 137 MMOL/L
WBC # BLD: 7.6 K/MCL

## 2020-12-05 NOTE — DISCHARGE SUMMARY
[facility] HOSPITALIST  DISCHARGE SUMMARY     Belinda Cheung Patient Status:  Inpatient    1965 MRN [id number]   Location [facility] 3W/SW Attending Sue Espinoza MD   [address] Day # 2 PCP Nena Rosales MD     DATE OF ADMISSION: 12/3/2020  DATE OF chest pain, new neurologic symptoms, other concerning symptoms. PHYSICAL EXAM:  Temp:  [98.2 °F (36.8 °C)-98.3 °F (36.8 °C)] 98.3 °F (36.8 °C)  Pulse:  [75-84] 76  Resp:  [16-20] 20  BP: (136-162)/(81-91) 147/88  Gen: A+Ox3. No distress.    HEENT: NCAT, daily.   Quantity: 180 tablet  Refills: 1        STOP taking these medications    LORazepam 2 MG Tabs  Commonly known as: ATIVAN        Meclizine HCl 25 MG Tabs  Commonly known as: ANTIVERT              Where to Get Your Medications      Please  you

## 2020-12-05 NOTE — PLAN OF CARE
Patient was provided with discharge instructions, education, and follow up information. Printed prescriptions were given to patient. Patient will be discharged home.     Problem: Patient Centered Care  Goal: Patient preferences are identified and integrated response  - Implement non-pharmacological measures as appropriate and evaluate response  - Consider cultural and social influences on pain and pain management  - Manage/alleviate anxiety  - Utilize distraction and/or relaxation techniques  - Monitor for op

## 2020-12-05 NOTE — PLAN OF CARE
Pt. resting comfortably throughout most of the shift. No c/o of chest pain. CIWA per order. Ativan PRN given with relief. Will continue to monitor.      Problem: Patient Centered Care  Goal: Patient preferences are identified and integrated in the patient' response  - Implement non-pharmacological measures as appropriate and evaluate response  - Consider cultural and social influences on pain and pain management  - Manage/alleviate anxiety  - Utilize distraction and/or relaxation techniques  - Monitor for op

## 2020-12-07 NOTE — PROGRESS NOTES
PANDA reached out to patient, he prefers [facility] [place] . Will call back tomorrow with .

## 2020-12-07 NOTE — PROGRESS NOTES
1st attempt [facility] apt request    F/U with APRN in 2-3 weeks  MICHELLE LUCERO [facility]  [address]. Dec. 28th @9:30am

## 2020-12-08 NOTE — PROGRESS NOTES
Initial Post Discharge Follow Up   Discharge Date: 12/5/20  Contact Date: 12/7/2020    Consent Verification:  Assessment Completed With: Patient  HIPAA Verified?   Yes    Discharge Dx:    NSTEMI    General:   • How have you been since your discharge from (two) times daily.  180 tablet 1     • (NCM)  Were there any medication changes noted on AVS?  yes  o If so, were these medication changes discussed with you prior to leaving the hospital? yes  • (NCM) If a new medication was prescribed:    o Was the new me yesterday. Pt advised to taper dose today and tomorrow to 5 mg Q 12 hours as ordered and follow up w PCP on Thursday. Patient denied any other issues or concerns.      CCM referral placed:  No    BOOK BY DATE: 12/19/2020    [x]  Discharge Summary, Discharge

## 2020-12-10 PROBLEM — F41.9 ANXIETY: Status: ACTIVE | Noted: 2020-01-01

## 2020-12-14 RX ORDER — ATORVASTATIN CALCIUM 40 MG/1
40 TABLET, FILM COATED ORAL NIGHTLY
COMMUNITY
Start: 2015-02-24

## 2020-12-14 RX ORDER — METOPROLOL TARTRATE 50 MG/1
50 TABLET, FILM COATED ORAL 2 TIMES DAILY
COMMUNITY
Start: 2020-12-10

## 2020-12-14 RX ORDER — LISINOPRIL 40 MG/1
40 TABLET ORAL DAILY
COMMUNITY
Start: 2020-12-10

## 2020-12-14 RX ORDER — DIAZEPAM 5 MG/1
5-10 TABLET ORAL 3 TIMES DAILY PRN
COMMUNITY
Start: 2013-06-24 | End: 2021-02-04

## 2020-12-14 RX ORDER — AMLODIPINE BESYLATE 5 MG/1
5 TABLET ORAL DAILY
COMMUNITY
Start: 2020-12-10 | End: 2021-02-04

## 2020-12-14 NOTE — PROGRESS NOTES
HPI:    Cynthia Cummings is a 48year old male here today for hospital follow up.    He was discharged from Inpatient hospital[facility]  to Home   Admission Date: 12/3/20   Discharge Date: 12/5/20  Hospital Discharge Diagnoses (since 11/14/2020) APPLY 4 GRAMS TOPICALLY 4 (FOUR) TIMES DAILY. No current facility-administered medications on file prior to visit.          HISTORY: reconciled and reviewed with patient  He  has a past medical history of Alcohol abuse, Anxiety, Essential hypertension, H intact    ASSESSMENT/ PLAN:     Anxiety  -     [address] patient that I think it is a good idea for him to start going to [facility] 77 meetings, to find a talk therapist.  Will determine need for addiction medicine in the future.   [place] 180 tablet 1     Sig: Take 1 tablet (50 mg total) by mouth 2 (two) times daily.        Imaging & Consults:  [facility] NAVIGATOR       Transitional Care Management Certification:  I certify that the following are true:  Communication with the patient was made withi

## 2020-12-28 ENCOUNTER — OFFICE VISIT (OUTPATIENT)
Dept: CARDIOLOGY | Age: 55
End: 2020-12-28

## 2020-12-28 VITALS
OXYGEN SATURATION: 95 % | RESPIRATION RATE: 18 BRPM | HEART RATE: 70 BPM | DIASTOLIC BLOOD PRESSURE: 64 MMHG | WEIGHT: 295 LBS | HEIGHT: 67 IN | BODY MASS INDEX: 46.3 KG/M2 | SYSTOLIC BLOOD PRESSURE: 108 MMHG

## 2020-12-28 DIAGNOSIS — I25.10 CORONARY ARTERY DISEASE INVOLVING NATIVE CORONARY ARTERY OF NATIVE HEART WITHOUT ANGINA PECTORIS: Primary | ICD-10-CM

## 2020-12-28 DIAGNOSIS — E78.00 HYPERCHOLESTEROLEMIA: ICD-10-CM

## 2020-12-28 DIAGNOSIS — I10 ESSENTIAL HYPERTENSION: ICD-10-CM

## 2020-12-28 PROBLEM — I21.4 NSTEMI (NON-ST ELEVATED MYOCARDIAL INFARCTION) (CMD): Status: ACTIVE | Noted: 2020-12-03

## 2020-12-28 PROCEDURE — 3074F SYST BP LT 130 MM HG: CPT | Performed by: NURSE PRACTITIONER

## 2020-12-28 PROCEDURE — 99214 OFFICE O/P EST MOD 30 MIN: CPT | Performed by: NURSE PRACTITIONER

## 2020-12-28 PROCEDURE — 3078F DIAST BP <80 MM HG: CPT | Performed by: NURSE PRACTITIONER

## 2020-12-28 ASSESSMENT — PATIENT HEALTH QUESTIONNAIRE - PHQ9
CLINICAL INTERPRETATION OF PHQ9 SCORE: NO FURTHER SCREENING NEEDED
CLINICAL INTERPRETATION OF PHQ2 SCORE: NO FURTHER SCREENING NEEDED
2. FEELING DOWN, DEPRESSED OR HOPELESS: SEVERAL DAYS
SUM OF ALL RESPONSES TO PHQ9 QUESTIONS 1 AND 2: 1
1. LITTLE INTEREST OR PLEASURE IN DOING THINGS: NOT AT ALL
SUM OF ALL RESPONSES TO PHQ9 QUESTIONS 1 AND 2: 1

## 2021-01-01 ENCOUNTER — APPOINTMENT (OUTPATIENT)
Dept: GENERAL RADIOLOGY | Facility: HOSPITAL | Age: 56
DRG: 871 | End: 2021-01-01
Attending: INTERNAL MEDICINE
Payer: COMMERCIAL

## 2021-01-01 ENCOUNTER — LAB ENCOUNTER (OUTPATIENT)
Dept: LAB | Age: 56
End: 2021-01-01
Attending: FAMILY MEDICINE
Payer: COMMERCIAL

## 2021-01-01 ENCOUNTER — APPOINTMENT (OUTPATIENT)
Dept: INTERVENTIONAL RADIOLOGY/VASCULAR | Facility: HOSPITAL | Age: 56
DRG: 872 | End: 2021-01-01
Attending: INTERNAL MEDICINE
Payer: COMMERCIAL

## 2021-01-01 ENCOUNTER — TELEPHONE (OUTPATIENT)
Dept: GASTROENTEROLOGY | Facility: CLINIC | Age: 56
End: 2021-01-01

## 2021-01-01 ENCOUNTER — PATIENT OUTREACH (OUTPATIENT)
Dept: CASE MANAGEMENT | Age: 56
End: 2021-01-01

## 2021-01-01 ENCOUNTER — HOSPITAL ENCOUNTER (OUTPATIENT)
Dept: NUTRITION | Facility: HOSPITAL | Age: 56
Discharge: HOME | End: 2021-01-01
Attending: INTERNAL MEDICINE
Payer: COMMERCIAL

## 2021-01-01 ENCOUNTER — APPOINTMENT (OUTPATIENT)
Dept: INTERVENTIONAL RADIOLOGY/VASCULAR | Facility: HOSPITAL | Age: 56
DRG: 872 | End: 2021-01-01
Attending: CLINICAL NURSE SPECIALIST
Payer: COMMERCIAL

## 2021-01-01 ENCOUNTER — OFFICE VISIT (OUTPATIENT)
Dept: GASTROENTEROLOGY | Facility: CLINIC | Age: 56
End: 2021-01-01

## 2021-01-01 ENCOUNTER — APPOINTMENT (OUTPATIENT)
Dept: GENERAL RADIOLOGY | Facility: HOSPITAL | Age: 56
DRG: 872 | End: 2021-01-01
Attending: INTERNAL MEDICINE
Payer: COMMERCIAL

## 2021-01-01 ENCOUNTER — APPOINTMENT (OUTPATIENT)
Dept: CT IMAGING | Facility: HOSPITAL | Age: 56
DRG: 872 | End: 2021-01-01
Attending: HOSPITALIST
Payer: COMMERCIAL

## 2021-01-01 ENCOUNTER — OFFICE VISIT (OUTPATIENT)
Dept: FAMILY MEDICINE CLINIC | Facility: CLINIC | Age: 56
End: 2021-01-01

## 2021-01-01 ENCOUNTER — ANESTHESIA EVENT (OUTPATIENT)
Dept: MEDSURG UNIT | Facility: HOSPITAL | Age: 56
DRG: 872 | End: 2021-01-01
Payer: COMMERCIAL

## 2021-01-01 ENCOUNTER — APPOINTMENT (OUTPATIENT)
Dept: GENERAL RADIOLOGY | Facility: HOSPITAL | Age: 56
DRG: 872 | End: 2021-01-01
Attending: RADIOLOGY
Payer: COMMERCIAL

## 2021-01-01 ENCOUNTER — APPOINTMENT (OUTPATIENT)
Dept: GENERAL RADIOLOGY | Facility: HOSPITAL | Age: 56
DRG: 871 | End: 2021-01-01
Attending: EMERGENCY MEDICINE
Payer: COMMERCIAL

## 2021-01-01 ENCOUNTER — ANESTHESIA (OUTPATIENT)
Dept: MEDSURG UNIT | Facility: HOSPITAL | Age: 56
DRG: 872 | End: 2021-01-01
Payer: COMMERCIAL

## 2021-01-01 ENCOUNTER — APPOINTMENT (OUTPATIENT)
Dept: GENERAL RADIOLOGY | Facility: HOSPITAL | Age: 56
DRG: 872 | End: 2021-01-01
Attending: HOSPITALIST
Payer: COMMERCIAL

## 2021-01-01 ENCOUNTER — APPOINTMENT (OUTPATIENT)
Dept: ULTRASOUND IMAGING | Facility: HOSPITAL | Age: 56
DRG: 871 | End: 2021-01-01
Attending: INTERNAL MEDICINE
Payer: COMMERCIAL

## 2021-01-01 ENCOUNTER — HOSPITAL ENCOUNTER (INPATIENT)
Facility: HOSPITAL | Age: 56
LOS: 5 days | Discharge: HOME | DRG: 871 | End: 2021-01-01
Attending: EMERGENCY MEDICINE | Admitting: HOSPITALIST
Payer: COMMERCIAL

## 2021-01-01 ENCOUNTER — APPOINTMENT (OUTPATIENT)
Dept: PICC SERVICES | Facility: HOSPITAL | Age: 56
DRG: 871 | End: 2021-01-01
Attending: HOSPITALIST
Payer: COMMERCIAL

## 2021-01-01 ENCOUNTER — LAB ENCOUNTER (OUTPATIENT)
Dept: LAB | Facility: HOSPITAL | Age: 56
End: 2021-01-01
Attending: INTERNAL MEDICINE
Payer: COMMERCIAL

## 2021-01-01 ENCOUNTER — HOSPITAL ENCOUNTER (INPATIENT)
Facility: HOSPITAL | Age: 56
LOS: 8 days | DRG: 872 | End: 2021-01-01
Attending: EMERGENCY MEDICINE | Admitting: HOSPITALIST
Payer: COMMERCIAL

## 2021-01-01 ENCOUNTER — APPOINTMENT (OUTPATIENT)
Dept: GENERAL RADIOLOGY | Facility: HOSPITAL | Age: 56
DRG: 872 | End: 2021-01-01
Attending: EMERGENCY MEDICINE
Payer: COMMERCIAL

## 2021-01-01 ENCOUNTER — APPOINTMENT (OUTPATIENT)
Dept: CT IMAGING | Facility: HOSPITAL | Age: 56
DRG: 871 | End: 2021-01-01
Attending: EMERGENCY MEDICINE
Payer: COMMERCIAL

## 2021-01-01 VITALS
WEIGHT: 291 LBS | DIASTOLIC BLOOD PRESSURE: 74 MMHG | BODY MASS INDEX: 45.67 KG/M2 | SYSTOLIC BLOOD PRESSURE: 119 MMHG | HEART RATE: 70 BPM | HEIGHT: 67 IN

## 2021-01-01 VITALS
SYSTOLIC BLOOD PRESSURE: 132 MMHG | WEIGHT: 310.63 LBS | DIASTOLIC BLOOD PRESSURE: 72 MMHG | BODY MASS INDEX: 48.75 KG/M2 | OXYGEN SATURATION: 96 % | TEMPERATURE: 98 F | HEART RATE: 83 BPM | HEIGHT: 67 IN | RESPIRATION RATE: 20 BRPM

## 2021-01-01 VITALS
WEIGHT: 299 LBS | TEMPERATURE: 97 F | DIASTOLIC BLOOD PRESSURE: 74 MMHG | HEIGHT: 67 IN | SYSTOLIC BLOOD PRESSURE: 127 MMHG | HEART RATE: 67 BPM | BODY MASS INDEX: 46.93 KG/M2

## 2021-01-01 VITALS
HEIGHT: 67 IN | SYSTOLIC BLOOD PRESSURE: 98 MMHG | HEART RATE: 71 BPM | BODY MASS INDEX: 45.52 KG/M2 | DIASTOLIC BLOOD PRESSURE: 62 MMHG | WEIGHT: 290 LBS | TEMPERATURE: 97 F

## 2021-01-01 VITALS
OXYGEN SATURATION: 97 % | TEMPERATURE: 95 F | DIASTOLIC BLOOD PRESSURE: 61 MMHG | HEIGHT: 67 IN | RESPIRATION RATE: 14 BRPM | BODY MASS INDEX: 48.56 KG/M2 | HEART RATE: 35 BPM | SYSTOLIC BLOOD PRESSURE: 74 MMHG | WEIGHT: 309.38 LBS

## 2021-01-01 DIAGNOSIS — N17.9 AKI (ACUTE KIDNEY INJURY) (HCC): ICD-10-CM

## 2021-01-01 DIAGNOSIS — F10.21 HISTORY OF ALCOHOL DEPENDENCE (HCC): ICD-10-CM

## 2021-01-01 DIAGNOSIS — R74.8 ABNORMAL AST AND ALT: ICD-10-CM

## 2021-01-01 DIAGNOSIS — R74.8 ABNORMAL LIVER ENZYMES: ICD-10-CM

## 2021-01-01 DIAGNOSIS — Z01.818 PREOP EXAMINATION: ICD-10-CM

## 2021-01-01 DIAGNOSIS — Z11.59 SPECIAL SCREENING EXAMINATION FOR VIRAL DISEASE: ICD-10-CM

## 2021-01-01 DIAGNOSIS — K76.0 HEPATIC STEATOSIS: ICD-10-CM

## 2021-01-01 DIAGNOSIS — N17.9 ACUTE RENAL FAILURE, UNSPECIFIED ACUTE RENAL FAILURE TYPE (HCC): Primary | ICD-10-CM

## 2021-01-01 DIAGNOSIS — R79.89 ELEVATED PROCALCITONIN: ICD-10-CM

## 2021-01-01 DIAGNOSIS — D69.6 THROMBOCYTOPENIA (HCC): ICD-10-CM

## 2021-01-01 DIAGNOSIS — E78.00 HYPERCHOLESTEREMIA: ICD-10-CM

## 2021-01-01 DIAGNOSIS — R17 SCLERAL ICTERUS: ICD-10-CM

## 2021-01-01 DIAGNOSIS — N17.9 ACUTE RENAL FAILURE, UNSPECIFIED ACUTE RENAL FAILURE TYPE (HCC): ICD-10-CM

## 2021-01-01 DIAGNOSIS — D64.9 ANEMIA, UNSPECIFIED TYPE: ICD-10-CM

## 2021-01-01 DIAGNOSIS — R39.89 DARK YELLOW-COLORED URINE: Primary | ICD-10-CM

## 2021-01-01 DIAGNOSIS — Z02.9 ENCOUNTERS FOR UNSPECIFIED ADMINISTRATIVE PURPOSE: ICD-10-CM

## 2021-01-01 DIAGNOSIS — I10 ESSENTIAL HYPERTENSION: ICD-10-CM

## 2021-01-01 DIAGNOSIS — Z23 NEED FOR VACCINATION: ICD-10-CM

## 2021-01-01 DIAGNOSIS — R50.9 FEVER, UNSPECIFIED FEVER CAUSE: ICD-10-CM

## 2021-01-01 DIAGNOSIS — R17 JAUNDICE: Primary | ICD-10-CM

## 2021-01-01 DIAGNOSIS — F10.21 ALCOHOL DEPENDENCE IN REMISSION (HCC): ICD-10-CM

## 2021-01-01 DIAGNOSIS — F41.9 ANXIETY: ICD-10-CM

## 2021-01-01 DIAGNOSIS — R17 TOTAL BILIRUBIN, ELEVATED: ICD-10-CM

## 2021-01-01 DIAGNOSIS — R17 JAUNDICE: ICD-10-CM

## 2021-01-01 DIAGNOSIS — Z87.898 HISTORY OF BACTEREMIA: ICD-10-CM

## 2021-01-01 DIAGNOSIS — Z12.11 SCREEN FOR COLON CANCER: Primary | ICD-10-CM

## 2021-01-01 DIAGNOSIS — K74.60 HEPATIC CIRRHOSIS, UNSPECIFIED HEPATIC CIRRHOSIS TYPE, UNSPECIFIED WHETHER ASCITES PRESENT (HCC): ICD-10-CM

## 2021-01-01 DIAGNOSIS — N10 ACUTE PYELONEPHRITIS: ICD-10-CM

## 2021-01-01 DIAGNOSIS — N12 PYELONEPHRITIS: Primary | ICD-10-CM

## 2021-01-01 DIAGNOSIS — R74.8 ABNORMAL LIVER ENZYMES: Primary | ICD-10-CM

## 2021-01-01 LAB
ALBUMIN SERPL-MCNC: 2.1 G/DL (ref 3.4–5)
ALBUMIN SERPL-MCNC: 2.2 G/DL (ref 3.4–5)
ALBUMIN SERPL-MCNC: 2.3 G/DL (ref 3.4–5)
ALBUMIN SERPL-MCNC: 2.5 G/DL (ref 3.4–5)
ALBUMIN SERPL-MCNC: 2.7 G/DL (ref 3.4–5)
ALBUMIN SERPL-MCNC: 2.7 G/DL (ref 3.4–5)
ALBUMIN SERPL-MCNC: 2.8 G/DL (ref 3.4–5)
ALBUMIN/GLOB SERPL: 0.6 {RATIO} (ref 1–2)
ALBUMIN/GLOB SERPL: 0.7 {RATIO} (ref 1–2)
ALP LIVER SERPL-CCNC: 100 U/L
ALP LIVER SERPL-CCNC: 105 U/L
ALP LIVER SERPL-CCNC: 132 U/L
ALP LIVER SERPL-CCNC: 163 U/L
ALP LIVER SERPL-CCNC: 89 U/L
ALT SERPL-CCNC: 36 U/L
ALT SERPL-CCNC: 41 U/L
ALT SERPL-CCNC: 46 U/L
ALT SERPL-CCNC: 60 U/L
ALT SERPL-CCNC: 65 U/L
ANION GAP SERPL CALC-SCNC: 10 MMOL/L (ref 0–18)
ANION GAP SERPL CALC-SCNC: 10 MMOL/L (ref 0–18)
ANION GAP SERPL CALC-SCNC: 7 MMOL/L (ref 0–18)
ANION GAP SERPL CALC-SCNC: 7 MMOL/L (ref 0–18)
ANION GAP SERPL CALC-SCNC: 8 MMOL/L (ref 0–18)
ANION GAP SERPL CALC-SCNC: 9 MMOL/L (ref 0–18)
APTT PPP: 47.1 SECONDS (ref 23.2–35.3)
AST SERPL-CCNC: 104 U/L (ref 15–37)
AST SERPL-CCNC: 45 U/L (ref 15–37)
AST SERPL-CCNC: 55 U/L (ref 15–37)
AST SERPL-CCNC: 58 U/L (ref 15–37)
AST SERPL-CCNC: 94 U/L (ref 15–37)
BASOPHILS # BLD AUTO: 0.04 X10(3) UL (ref 0–0.2)
BASOPHILS # BLD AUTO: 0.05 X10(3) UL (ref 0–0.2)
BASOPHILS # BLD AUTO: 0.06 X10(3) UL (ref 0–0.2)
BASOPHILS # BLD AUTO: 0.06 X10(3) UL (ref 0–0.2)
BASOPHILS # BLD AUTO: 0.11 X10(3) UL (ref 0–0.2)
BASOPHILS NFR BLD AUTO: 0.4 %
BASOPHILS NFR BLD AUTO: 0.5 %
BASOPHILS NFR BLD AUTO: 0.6 %
BASOPHILS NFR BLD AUTO: 1.1 %
BILIRUB DIRECT SERPL-MCNC: 3.2 MG/DL (ref 0–0.2)
BILIRUB SERPL-MCNC: 2.3 MG/DL (ref 0.1–2)
BILIRUB SERPL-MCNC: 4.4 MG/DL (ref 0.1–2)
BILIRUB SERPL-MCNC: 4.6 MG/DL (ref 0.1–2)
BILIRUB SERPL-MCNC: 4.7 MG/DL (ref 0.1–2)
BILIRUB SERPL-MCNC: 6.1 MG/DL (ref 0.1–2)
BILIRUB UR QL: NEGATIVE
BUN BLD-MCNC: 12 MG/DL (ref 7–18)
BUN BLD-MCNC: 30 MG/DL (ref 7–18)
BUN BLD-MCNC: 31 MG/DL (ref 7–18)
BUN BLD-MCNC: 37 MG/DL (ref 7–18)
BUN BLD-MCNC: 40 MG/DL (ref 7–18)
BUN BLD-MCNC: 46 MG/DL (ref 7–18)
BUN BLD-MCNC: 51 MG/DL (ref 7–18)
BUN BLD-MCNC: 9 MG/DL (ref 7–18)
BUN/CREAT SERPL: 10.4 (ref 10–20)
BUN/CREAT SERPL: 10.6 (ref 10–20)
BUN/CREAT SERPL: 12.9 (ref 10–20)
BUN/CREAT SERPL: 15.4 (ref 10–20)
BUN/CREAT SERPL: 17.9 (ref 10–20)
BUN/CREAT SERPL: 7.6 (ref 10–20)
BUN/CREAT SERPL: 9.2 (ref 10–20)
BUN/CREAT SERPL: 9.5 (ref 10–20)
CALCIUM BLD-MCNC: 7 MG/DL (ref 8.5–10.1)
CALCIUM BLD-MCNC: 7.5 MG/DL (ref 8.5–10.1)
CALCIUM BLD-MCNC: 7.5 MG/DL (ref 8.5–10.1)
CALCIUM BLD-MCNC: 8.1 MG/DL (ref 8.5–10.1)
CALCIUM BLD-MCNC: 8.2 MG/DL (ref 8.5–10.1)
CALCIUM BLD-MCNC: 8.2 MG/DL (ref 8.5–10.1)
CALCIUM BLD-MCNC: 8.9 MG/DL (ref 8.5–10.1)
CALCIUM BLD-MCNC: 9.2 MG/DL (ref 8.5–10.1)
CHLORIDE SERPL-SCNC: 102 MMOL/L (ref 98–112)
CHLORIDE SERPL-SCNC: 107 MMOL/L (ref 98–112)
CHLORIDE SERPL-SCNC: 110 MMOL/L (ref 98–112)
CHLORIDE SERPL-SCNC: 110 MMOL/L (ref 98–112)
CHLORIDE SERPL-SCNC: 111 MMOL/L (ref 98–112)
CHLORIDE SERPL-SCNC: 112 MMOL/L (ref 98–112)
CHOLEST SMN-MCNC: 157 MG/DL (ref ?–200)
CO2 SERPL-SCNC: 16 MMOL/L (ref 21–32)
CO2 SERPL-SCNC: 18 MMOL/L (ref 21–32)
CO2 SERPL-SCNC: 19 MMOL/L (ref 21–32)
CO2 SERPL-SCNC: 19 MMOL/L (ref 21–32)
CO2 SERPL-SCNC: 20 MMOL/L (ref 21–32)
CO2 SERPL-SCNC: 20 MMOL/L (ref 21–32)
CO2 SERPL-SCNC: 23 MMOL/L (ref 21–32)
CO2 SERPL-SCNC: 23 MMOL/L (ref 21–32)
COLOR UR: YELLOW
CREAT BLD-MCNC: 1.18 MG/DL
CREAT BLD-MCNC: 1.26 MG/DL
CREAT BLD-MCNC: 1.68 MG/DL
CREAT BLD-MCNC: 2.6 MG/DL
CREAT BLD-MCNC: 2.92 MG/DL
CREAT BLD-MCNC: 3.94 MG/DL
CREAT BLD-MCNC: 4.02 MG/DL
CREAT BLD-MCNC: 4.42 MG/DL
DEPRECATED RDW RBC AUTO: 57.8 FL (ref 35.1–46.3)
DEPRECATED RDW RBC AUTO: 58.4 FL (ref 35.1–46.3)
DEPRECATED RDW RBC AUTO: 58.4 FL (ref 35.1–46.3)
DEPRECATED RDW RBC AUTO: 58.9 FL (ref 35.1–46.3)
DEPRECATED RDW RBC AUTO: 59.4 FL (ref 35.1–46.3)
DEPRECATED RDW RBC AUTO: 59.7 FL (ref 35.1–46.3)
DEPRECATED RDW RBC AUTO: 64.5 FL (ref 35.1–46.3)
EOSINOPHIL # BLD AUTO: 0.02 X10(3) UL (ref 0–0.7)
EOSINOPHIL # BLD AUTO: 0.09 X10(3) UL (ref 0–0.7)
EOSINOPHIL # BLD AUTO: 0.16 X10(3) UL (ref 0–0.7)
EOSINOPHIL # BLD AUTO: 0.18 X10(3) UL (ref 0–0.7)
EOSINOPHIL # BLD AUTO: 0.2 X10(3) UL (ref 0–0.7)
EOSINOPHIL # BLD AUTO: 0.24 X10(3) UL (ref 0–0.7)
EOSINOPHIL # BLD AUTO: 0.27 X10(3) UL (ref 0–0.7)
EOSINOPHIL NFR BLD AUTO: 0.2 %
EOSINOPHIL NFR BLD AUTO: 0.9 %
EOSINOPHIL NFR BLD AUTO: 1.4 %
EOSINOPHIL NFR BLD AUTO: 1.7 %
EOSINOPHIL NFR BLD AUTO: 1.8 %
EOSINOPHIL NFR BLD AUTO: 2.2 %
EOSINOPHIL NFR BLD AUTO: 2.5 %
ERYTHROCYTE [DISTWIDTH] IN BLOOD BY AUTOMATED COUNT: 17.8 % (ref 11–15)
ERYTHROCYTE [DISTWIDTH] IN BLOOD BY AUTOMATED COUNT: 17.8 % (ref 11–15)
ERYTHROCYTE [DISTWIDTH] IN BLOOD BY AUTOMATED COUNT: 17.9 % (ref 11–15)
ERYTHROCYTE [DISTWIDTH] IN BLOOD BY AUTOMATED COUNT: 18.1 % (ref 11–15)
ERYTHROCYTE [DISTWIDTH] IN BLOOD BY AUTOMATED COUNT: 19 % (ref 11–15)
GLOBULIN PLAS-MCNC: 3.4 G/DL (ref 2.8–4.4)
GLOBULIN PLAS-MCNC: 3.7 G/DL (ref 2.8–4.4)
GLOBULIN PLAS-MCNC: 4.4 G/DL (ref 2.8–4.4)
GLOBULIN PLAS-MCNC: 4.7 G/DL (ref 2.8–4.4)
GLUCOSE (URINE DIPSTICK): NEGATIVE MG/DL
GLUCOSE BLD-MCNC: 104 MG/DL (ref 70–99)
GLUCOSE BLD-MCNC: 119 MG/DL (ref 70–99)
GLUCOSE BLD-MCNC: 90 MG/DL (ref 70–99)
GLUCOSE BLD-MCNC: 93 MG/DL (ref 70–99)
GLUCOSE BLD-MCNC: 94 MG/DL (ref 70–99)
GLUCOSE BLD-MCNC: 95 MG/DL (ref 70–99)
GLUCOSE BLD-MCNC: 96 MG/DL (ref 70–99)
GLUCOSE BLD-MCNC: 97 MG/DL (ref 70–99)
GLUCOSE UR-MCNC: NEGATIVE MG/DL
HAPTOGLOB SERPL-MCNC: 35.5 MG/DL (ref 30–200)
HAV AB SER QL IA: REACTIVE
HAV AB SER QL IA: REACTIVE
HAV IGM SER QL: 1.4 MG/DL (ref 1.6–2.6)
HAV IGM SER QL: 1.7 MG/DL (ref 1.6–2.6)
HAV IGM SER QL: 1.9 MG/DL (ref 1.6–2.6)
HAV IGM SER QL: 2 MG/DL (ref 1.6–2.6)
HAV IGM SER QL: NONREACTIVE
HAV IGM SER QL: NONREACTIVE
HBV CORE AB SERPL QL IA: NONREACTIVE
HBV CORE AB SERPL QL IA: NONREACTIVE
HBV SURFACE AB SER QL: NONREACTIVE
HBV SURFACE AB SER QL: NONREACTIVE
HBV SURFACE AB SERPL IA-ACNC: <3.1 MIU/ML
HBV SURFACE AB SERPL IA-ACNC: <3.1 MIU/ML
HBV SURFACE AG SERPL QL IA: NONREACTIVE
HBV SURFACE AG SERPL QL IA: NONREACTIVE
HCT VFR BLD AUTO: 25.2 %
HCT VFR BLD AUTO: 26.9 %
HCT VFR BLD AUTO: 27.1 %
HCT VFR BLD AUTO: 27.2 %
HCT VFR BLD AUTO: 27.2 %
HCT VFR BLD AUTO: 27.7 %
HCT VFR BLD AUTO: 30.2 %
HCV AB SERPL QL IA: NONREACTIVE
HCV AB SERPL QL IA: NONREACTIVE
HDLC SERPL-MCNC: 34 MG/DL (ref 40–59)
HGB BLD-MCNC: 10.7 G/DL
HGB BLD-MCNC: 8.9 G/DL
HGB BLD-MCNC: 9.1 G/DL
HGB BLD-MCNC: 9.4 G/DL
HGB BLD-MCNC: 9.4 G/DL
HGB BLD-MCNC: 9.5 G/DL
HGB BLD-MCNC: 9.7 G/DL
IMM GRANULOCYTES # BLD AUTO: 0.03 X10(3) UL (ref 0–1)
IMM GRANULOCYTES # BLD AUTO: 0.04 X10(3) UL (ref 0–1)
IMM GRANULOCYTES # BLD AUTO: 0.07 X10(3) UL (ref 0–1)
IMM GRANULOCYTES # BLD AUTO: 0.11 X10(3) UL (ref 0–1)
IMM GRANULOCYTES # BLD AUTO: 0.17 X10(3) UL (ref 0–1)
IMM GRANULOCYTES # BLD AUTO: 0.17 X10(3) UL (ref 0–1)
IMM GRANULOCYTES # BLD AUTO: 0.19 X10(3) UL (ref 0–1)
IMM GRANULOCYTES NFR BLD: 0.3 %
IMM GRANULOCYTES NFR BLD: 0.4 %
IMM GRANULOCYTES NFR BLD: 0.7 %
IMM GRANULOCYTES NFR BLD: 1 %
IMM GRANULOCYTES NFR BLD: 1.5 %
IMM GRANULOCYTES NFR BLD: 1.5 %
IMM GRANULOCYTES NFR BLD: 1.7 %
INR BLD: 1.85 (ref 0.9–1.2)
K PNEUMON DNA BLD POS QL NAA+NON-PROBE: DETECTED
KETONES (URINE DIPSTICK): 15 MG/DL
KETONES UR-MCNC: NEGATIVE MG/DL
LACTATE SERPL-SCNC: 1.8 MMOL/L (ref 0.4–2)
LACTATE SERPL-SCNC: 2.8 MMOL/L (ref 0.4–2)
LDH SERPL L TO P-CCNC: 255 U/L
LDLC SERPL CALC-MCNC: 92 MG/DL (ref ?–100)
LEUKOCYTES: NEGATIVE
LIPASE SERPL-CCNC: 132 U/L (ref 73–393)
LYMPHOCYTES # BLD AUTO: 0.99 X10(3) UL (ref 1–4)
LYMPHOCYTES # BLD AUTO: 1.5 X10(3) UL (ref 1–4)
LYMPHOCYTES # BLD AUTO: 1.8 X10(3) UL (ref 1–4)
LYMPHOCYTES # BLD AUTO: 1.95 X10(3) UL (ref 1–4)
LYMPHOCYTES # BLD AUTO: 2.44 X10(3) UL (ref 1–4)
LYMPHOCYTES # BLD AUTO: 2.76 X10(3) UL (ref 1–4)
LYMPHOCYTES # BLD AUTO: 3.55 X10(3) UL (ref 1–4)
LYMPHOCYTES NFR BLD AUTO: 11.5 %
LYMPHOCYTES NFR BLD AUTO: 15.6 %
LYMPHOCYTES NFR BLD AUTO: 15.7 %
LYMPHOCYTES NFR BLD AUTO: 18 %
LYMPHOCYTES NFR BLD AUTO: 22 %
LYMPHOCYTES NFR BLD AUTO: 25.3 %
LYMPHOCYTES NFR BLD AUTO: 34 %
M PROTEIN MFR SERPL ELPH: 5.7 G/DL (ref 6.4–8.2)
M PROTEIN MFR SERPL ELPH: 5.8 G/DL (ref 6.4–8.2)
M PROTEIN MFR SERPL ELPH: 6.8 G/DL (ref 6.4–8.2)
M PROTEIN MFR SERPL ELPH: 7.2 G/DL (ref 6.4–8.2)
M PROTEIN MFR SERPL ELPH: 7.4 G/DL (ref 6.4–8.2)
MCH RBC QN AUTO: 30.7 PG (ref 26–34)
MCH RBC QN AUTO: 31 PG (ref 26–34)
MCH RBC QN AUTO: 31.1 PG (ref 26–34)
MCH RBC QN AUTO: 31.8 PG (ref 26–34)
MCH RBC QN AUTO: 32.1 PG (ref 26–34)
MCHC RBC AUTO-ENTMCNC: 33.8 G/DL (ref 31–37)
MCHC RBC AUTO-ENTMCNC: 33.9 G/DL (ref 31–37)
MCHC RBC AUTO-ENTMCNC: 34.6 G/DL (ref 31–37)
MCHC RBC AUTO-ENTMCNC: 35.1 G/DL (ref 31–37)
MCHC RBC AUTO-ENTMCNC: 35.3 G/DL (ref 31–37)
MCHC RBC AUTO-ENTMCNC: 35.4 G/DL (ref 31–37)
MCHC RBC AUTO-ENTMCNC: 35.7 G/DL (ref 31–37)
MCV RBC AUTO: 87.8 FL
MCV RBC AUTO: 88.9 FL
MCV RBC AUTO: 89.6 FL
MCV RBC AUTO: 90.1 FL
MCV RBC AUTO: 90.1 FL
MCV RBC AUTO: 90.9 FL
MCV RBC AUTO: 91.7 FL
MONOCYTES # BLD AUTO: 0.65 X10(3) UL (ref 0.1–1)
MONOCYTES # BLD AUTO: 0.92 X10(3) UL (ref 0.1–1)
MONOCYTES # BLD AUTO: 1.18 X10(3) UL (ref 0.1–1)
MONOCYTES # BLD AUTO: 1.2 X10(3) UL (ref 0.1–1)
MONOCYTES # BLD AUTO: 1.69 X10(3) UL (ref 0.1–1)
MONOCYTES # BLD AUTO: 1.72 X10(3) UL (ref 0.1–1)
MONOCYTES # BLD AUTO: 1.82 X10(3) UL (ref 0.1–1)
MONOCYTES NFR BLD AUTO: 10.9 %
MONOCYTES NFR BLD AUTO: 12.5 %
MONOCYTES NFR BLD AUTO: 15.5 %
MONOCYTES NFR BLD AUTO: 15.5 %
MONOCYTES NFR BLD AUTO: 15.7 %
MONOCYTES NFR BLD AUTO: 7.6 %
MONOCYTES NFR BLD AUTO: 8.8 %
MULTISTIX LOT#: 1044 NUMERIC
NEUTROPHILS # BLD AUTO: 5.64 X10 (3) UL (ref 1.5–7.7)
NEUTROPHILS # BLD AUTO: 5.64 X10(3) UL (ref 1.5–7.7)
NEUTROPHILS # BLD AUTO: 5.93 X10 (3) UL (ref 1.5–7.7)
NEUTROPHILS # BLD AUTO: 5.93 X10(3) UL (ref 1.5–7.7)
NEUTROPHILS # BLD AUTO: 6.45 X10 (3) UL (ref 1.5–7.7)
NEUTROPHILS # BLD AUTO: 6.45 X10(3) UL (ref 1.5–7.7)
NEUTROPHILS # BLD AUTO: 6.67 X10 (3) UL (ref 1.5–7.7)
NEUTROPHILS # BLD AUTO: 6.67 X10(3) UL (ref 1.5–7.7)
NEUTROPHILS # BLD AUTO: 6.86 X10 (3) UL (ref 1.5–7.7)
NEUTROPHILS # BLD AUTO: 6.86 X10(3) UL (ref 1.5–7.7)
NEUTROPHILS # BLD AUTO: 7.35 X10 (3) UL (ref 1.5–7.7)
NEUTROPHILS # BLD AUTO: 7.35 X10(3) UL (ref 1.5–7.7)
NEUTROPHILS # BLD AUTO: 7.57 X10 (3) UL (ref 1.5–7.7)
NEUTROPHILS # BLD AUTO: 7.57 X10(3) UL (ref 1.5–7.7)
NEUTROPHILS NFR BLD AUTO: 54 %
NEUTROPHILS NFR BLD AUTO: 54.4 %
NEUTROPHILS NFR BLD AUTO: 58.3 %
NEUTROPHILS NFR BLD AUTO: 65.4 %
NEUTROPHILS NFR BLD AUTO: 67.8 %
NEUTROPHILS NFR BLD AUTO: 69.7 %
NEUTROPHILS NFR BLD AUTO: 79.9 %
NITRITE UR QL STRIP.AUTO: NEGATIVE
NITRITE, URINE: POSITIVE
NONHDLC SERPL-MCNC: 123 MG/DL (ref ?–130)
OSMOLALITY SERPL CALC.SUM OF ELEC: 282 MOSM/KG (ref 275–295)
OSMOLALITY SERPL CALC.SUM OF ELEC: 288 MOSM/KG (ref 275–295)
OSMOLALITY SERPL CALC.SUM OF ELEC: 291 MOSM/KG (ref 275–295)
OSMOLALITY SERPL CALC.SUM OF ELEC: 292 MOSM/KG (ref 275–295)
OSMOLALITY SERPL CALC.SUM OF ELEC: 293 MOSM/KG (ref 275–295)
OSMOLALITY SERPL CALC.SUM OF ELEC: 295 MOSM/KG (ref 275–295)
OSMOLALITY SERPL CALC.SUM OF ELEC: 298 MOSM/KG (ref 275–295)
OSMOLALITY SERPL CALC.SUM OF ELEC: 298 MOSM/KG (ref 275–295)
PATIENT FASTING Y/N/NP: YES
PH UR: 5 [PH] (ref 5–8)
PH, URINE: 6 (ref 4.5–8)
PHOSPHATE SERPL-MCNC: 2.9 MG/DL (ref 2.5–4.9)
PHOSPHATE SERPL-MCNC: 3.8 MG/DL (ref 2.5–4.9)
PHOSPHATE SERPL-MCNC: 4.3 MG/DL (ref 2.5–4.9)
PLATELET # BLD AUTO: 188 10(3)UL (ref 150–450)
PLATELET # BLD AUTO: 62 10(3)UL (ref 150–450)
PLATELET # BLD AUTO: 62 10(3)UL (ref 150–450)
PLATELET # BLD AUTO: 66 10(3)UL (ref 150–450)
PLATELET # BLD AUTO: 66 10(3)UL (ref 150–450)
PLATELET # BLD AUTO: 71 10(3)UL (ref 150–450)
PLATELET # BLD AUTO: 72 10(3)UL (ref 150–450)
POTASSIUM SERPL-SCNC: 3.2 MMOL/L (ref 3.5–5.1)
POTASSIUM SERPL-SCNC: 3.3 MMOL/L (ref 3.5–5.1)
POTASSIUM SERPL-SCNC: 3.4 MMOL/L (ref 3.5–5.1)
POTASSIUM SERPL-SCNC: 3.7 MMOL/L (ref 3.5–5.1)
POTASSIUM SERPL-SCNC: 4 MMOL/L (ref 3.5–5.1)
PROT UR-MCNC: 100 MG/DL
PROTEIN (URINE DIPSTICK): 100 MG/DL
PROTHROMBIN TIME: 21 SECONDS (ref 11.8–14.5)
RBC # BLD AUTO: 2.87 X10(6)UL
RBC # BLD AUTO: 2.96 X10(6)UL
RBC # BLD AUTO: 3.02 X10(6)UL
RBC # BLD AUTO: 3.05 X10(6)UL
RBC # BLD AUTO: 3.37 X10(6)UL
RBC #/AREA URNS AUTO: >10 /HPF
SARS-COV-2 RNA RESP QL NAA+PROBE: NOT DETECTED
SARS-COV-2 RNA RESP QL NAA+PROBE: NOT DETECTED
SODIUM SERPL-SCNC: 132 MMOL/L (ref 136–145)
SODIUM SERPL-SCNC: 136 MMOL/L (ref 136–145)
SODIUM SERPL-SCNC: 136 MMOL/L (ref 136–145)
SODIUM SERPL-SCNC: 138 MMOL/L (ref 136–145)
SODIUM SERPL-SCNC: 138 MMOL/L (ref 136–145)
SODIUM SERPL-SCNC: 139 MMOL/L (ref 136–145)
SODIUM SERPL-SCNC: 140 MMOL/L (ref 136–145)
SODIUM SERPL-SCNC: 142 MMOL/L (ref 136–145)
SP GR UR STRIP: 1.02 (ref 1–1.03)
SPECIFIC GRAVITY: 1.02 (ref 1–1.03)
TRIGL SERPL-MCNC: 155 MG/DL (ref 30–149)
TROPONIN I SERPL-MCNC: <0.045 NG/ML (ref ?–0.04)
UROBILINOGEN UR STRIP-ACNC: 4
UROBILINOGEN,SEMI-QN: 4 MG/DL (ref 0–1.9)
VLDLC SERPL CALC-MCNC: 31 MG/DL (ref 0–30)
WBC # BLD AUTO: 10.4 X10(3) UL (ref 4–11)
WBC # BLD AUTO: 10.8 X10(3) UL (ref 4–11)
WBC # BLD AUTO: 10.9 X10(3) UL (ref 4–11)
WBC # BLD AUTO: 11.1 X10(3) UL (ref 4–11)
WBC # BLD AUTO: 11.6 X10(3) UL (ref 4–11)
WBC # BLD AUTO: 8.6 X10(3) UL (ref 4–11)
WBC # BLD AUTO: 9.6 X10(3) UL (ref 4–11)
WBC #/AREA URNS AUTO: >50 /HPF
WBC CLUMPS UR QL AUTO: PRESENT /HPF

## 2021-01-01 PROCEDURE — 99291 CRITICAL CARE FIRST HOUR: CPT | Performed by: INTERNAL MEDICINE

## 2021-01-01 PROCEDURE — 99232 SBSQ HOSP IP/OBS MODERATE 35: CPT | Performed by: INTERNAL MEDICINE

## 2021-01-01 PROCEDURE — 87340 HEPATITIS B SURFACE AG IA: CPT

## 2021-01-01 PROCEDURE — 99223 1ST HOSP IP/OBS HIGH 75: CPT | Performed by: HOSPITALIST

## 2021-01-01 PROCEDURE — 99233 SBSQ HOSP IP/OBS HIGH 50: CPT | Performed by: INTERNAL MEDICINE

## 2021-01-01 PROCEDURE — 3078F DIAST BP <80 MM HG: CPT | Performed by: FAMILY MEDICINE

## 2021-01-01 PROCEDURE — 99233 SBSQ HOSP IP/OBS HIGH 50: CPT | Performed by: HOSPITALIST

## 2021-01-01 PROCEDURE — 99243 OFF/OP CNSLTJ NEW/EST LOW 30: CPT | Performed by: INTERNAL MEDICINE

## 2021-01-01 PROCEDURE — 99495 TRANSJ CARE MGMT MOD F2F 14D: CPT | Performed by: FAMILY MEDICINE

## 2021-01-01 PROCEDURE — 3008F BODY MASS INDEX DOCD: CPT | Performed by: FAMILY MEDICINE

## 2021-01-01 PROCEDURE — 80061 LIPID PANEL: CPT

## 2021-01-01 PROCEDURE — 71045 X-RAY EXAM CHEST 1 VIEW: CPT | Performed by: INTERNAL MEDICINE

## 2021-01-01 PROCEDURE — 99253 IP/OBS CNSLTJ NEW/EST LOW 45: CPT | Performed by: UROLOGY

## 2021-01-01 PROCEDURE — 99231 SBSQ HOSP IP/OBS SF/LOW 25: CPT | Performed by: NURSE PRACTITIONER

## 2021-01-01 PROCEDURE — B4161ZZ FLUOROSCOPY OF RIGHT RENAL ARTERY USING LOW OSMOLAR CONTRAST: ICD-10-PCS | Performed by: RADIOLOGY

## 2021-01-01 PROCEDURE — 3078F DIAST BP <80 MM HG: CPT | Performed by: INTERNAL MEDICINE

## 2021-01-01 PROCEDURE — 99223 1ST HOSP IP/OBS HIGH 75: CPT | Performed by: INTERNAL MEDICINE

## 2021-01-01 PROCEDURE — 99255 IP/OBS CONSLTJ NEW/EST HI 80: CPT | Performed by: INTERNAL MEDICINE

## 2021-01-01 PROCEDURE — 74176 CT ABD & PELVIS W/O CONTRAST: CPT | Performed by: HOSPITALIST

## 2021-01-01 PROCEDURE — 02HV33Z INSERTION OF INFUSION DEVICE INTO SUPERIOR VENA CAVA, PERCUTANEOUS APPROACH: ICD-10-PCS | Performed by: RADIOLOGY

## 2021-01-01 PROCEDURE — 76775 US EXAM ABDO BACK WALL LIM: CPT | Performed by: INTERNAL MEDICINE

## 2021-01-01 PROCEDURE — 3074F SYST BP LT 130 MM HG: CPT | Performed by: FAMILY MEDICINE

## 2021-01-01 PROCEDURE — 85025 COMPLETE CBC W/AUTO DIFF WBC: CPT

## 2021-01-01 PROCEDURE — 5A1935Z RESPIRATORY VENTILATION, LESS THAN 24 CONSECUTIVE HOURS: ICD-10-PCS | Performed by: INTERNAL MEDICINE

## 2021-01-01 PROCEDURE — 86709 HEPATITIS A IGM ANTIBODY: CPT

## 2021-01-01 PROCEDURE — 30233N1 TRANSFUSION OF NONAUTOLOGOUS RED BLOOD CELLS INTO PERIPHERAL VEIN, PERCUTANEOUS APPROACH: ICD-10-PCS | Performed by: HOSPITALIST

## 2021-01-01 PROCEDURE — 99231 SBSQ HOSP IP/OBS SF/LOW 25: CPT | Performed by: UROLOGY

## 2021-01-01 PROCEDURE — 86706 HEP B SURFACE ANTIBODY: CPT

## 2021-01-01 PROCEDURE — 86803 HEPATITIS C AB TEST: CPT

## 2021-01-01 PROCEDURE — 90746 HEPB VACCINE 3 DOSE ADULT IM: CPT | Performed by: INTERNAL MEDICINE

## 2021-01-01 PROCEDURE — 71045 X-RAY EXAM CHEST 1 VIEW: CPT | Performed by: EMERGENCY MEDICINE

## 2021-01-01 PROCEDURE — 80500 HEPATITIS A B + C PROFILE: CPT

## 2021-01-01 PROCEDURE — 80053 COMPREHEN METABOLIC PANEL: CPT

## 2021-01-01 PROCEDURE — 90471 IMMUNIZATION ADMIN: CPT | Performed by: INTERNAL MEDICINE

## 2021-01-01 PROCEDURE — 5A12012 PERFORMANCE OF CARDIAC OUTPUT, SINGLE, MANUAL: ICD-10-PCS | Performed by: EMERGENCY MEDICINE

## 2021-01-01 PROCEDURE — 36415 COLL VENOUS BLD VENIPUNCTURE: CPT

## 2021-01-01 PROCEDURE — 99254 IP/OBS CNSLTJ NEW/EST MOD 60: CPT | Performed by: INTERNAL MEDICINE

## 2021-01-01 PROCEDURE — 99233 SBSQ HOSP IP/OBS HIGH 50: CPT | Performed by: PHYSICIAN ASSISTANT

## 2021-01-01 PROCEDURE — 74176 CT ABD & PELVIS W/O CONTRAST: CPT | Performed by: EMERGENCY MEDICINE

## 2021-01-01 PROCEDURE — 71045 X-RAY EXAM CHEST 1 VIEW: CPT | Performed by: HOSPITALIST

## 2021-01-01 PROCEDURE — 86708 HEPATITIS A ANTIBODY: CPT

## 2021-01-01 PROCEDURE — 99239 HOSP IP/OBS DSCHRG MGMT >30: CPT | Performed by: HOSPITALIST

## 2021-01-01 PROCEDURE — 3074F SYST BP LT 130 MM HG: CPT | Performed by: INTERNAL MEDICINE

## 2021-01-01 PROCEDURE — 81003 URINALYSIS AUTO W/O SCOPE: CPT | Performed by: FAMILY MEDICINE

## 2021-01-01 PROCEDURE — 99214 OFFICE O/P EST MOD 30 MIN: CPT | Performed by: FAMILY MEDICINE

## 2021-01-01 PROCEDURE — 86704 HEP B CORE ANTIBODY TOTAL: CPT

## 2021-01-01 PROCEDURE — 99232 SBSQ HOSP IP/OBS MODERATE 35: CPT | Performed by: PHYSICIAN ASSISTANT

## 2021-01-01 PROCEDURE — 30233K1 TRANSFUSION OF NONAUTOLOGOUS FROZEN PLASMA INTO PERIPHERAL VEIN, PERCUTANEOUS APPROACH: ICD-10-PCS | Performed by: HOSPITALIST

## 2021-01-01 PROCEDURE — 5A2204Z RESTORATION OF CARDIAC RHYTHM, SINGLE: ICD-10-PCS | Performed by: EMERGENCY MEDICINE

## 2021-01-01 PROCEDURE — 0BH17EZ INSERTION OF ENDOTRACHEAL AIRWAY INTO TRACHEA, VIA NATURAL OR ARTIFICIAL OPENING: ICD-10-PCS | Performed by: ANESTHESIOLOGY

## 2021-01-01 PROCEDURE — 3008F BODY MASS INDEX DOCD: CPT | Performed by: INTERNAL MEDICINE

## 2021-01-01 PROCEDURE — 97802 MEDICAL NUTRITION INDIV IN: CPT | Performed by: DIETITIAN, REGISTERED

## 2021-01-01 PROCEDURE — 71045 X-RAY EXAM CHEST 1 VIEW: CPT | Performed by: RADIOLOGY

## 2021-01-01 RX ORDER — POTASSIUM CHLORIDE 20 MEQ/1
40 TABLET, EXTENDED RELEASE ORAL ONCE
Status: COMPLETED | OUTPATIENT
Start: 2021-01-01 | End: 2021-01-01

## 2021-01-01 RX ORDER — ONDANSETRON 2 MG/ML
4 INJECTION INTRAMUSCULAR; INTRAVENOUS EVERY 6 HOURS PRN
Status: DISCONTINUED | OUTPATIENT
Start: 2021-01-01 | End: 2021-01-01

## 2021-01-01 RX ORDER — POLYETHYLENE GLYCOL 3350, SODIUM SULFATE ANHYDROUS, SODIUM BICARBONATE, SODIUM CHLORIDE, POTASSIUM CHLORIDE 236; 22.74; 6.74; 5.86; 2.97 G/4L; G/4L; G/4L; G/4L; G/4L
4000 POWDER, FOR SOLUTION ORAL ONCE
Qty: 4000 ML | Refills: 0 | Status: SHIPPED | OUTPATIENT
Start: 2021-01-01 | End: 2021-01-01

## 2021-01-01 RX ORDER — MELATONIN
100 DAILY
Status: DISCONTINUED | OUTPATIENT
Start: 2021-01-01 | End: 2021-01-01

## 2021-01-01 RX ORDER — ALBUMIN (HUMAN) 12.5 G/50ML
25 SOLUTION INTRAVENOUS EVERY 12 HOURS
Status: DISCONTINUED | OUTPATIENT
Start: 2021-01-01 | End: 2021-01-01

## 2021-01-01 RX ORDER — LORAZEPAM 2 MG/ML
1 INJECTION INTRAMUSCULAR
Status: DISCONTINUED | OUTPATIENT
Start: 2021-01-01 | End: 2021-01-01

## 2021-01-01 RX ORDER — ATORVASTATIN CALCIUM 40 MG/1
40 TABLET, FILM COATED ORAL NIGHTLY
Status: DISCONTINUED | OUTPATIENT
Start: 2021-01-01 | End: 2021-01-01

## 2021-01-01 RX ORDER — ACETAMINOPHEN 325 MG/1
650 TABLET ORAL EVERY 6 HOURS PRN
Status: DISCONTINUED | OUTPATIENT
Start: 2021-01-01 | End: 2021-01-01

## 2021-01-01 RX ORDER — METOPROLOL TARTRATE 50 MG/1
50 TABLET, FILM COATED ORAL 2 TIMES DAILY
Status: DISCONTINUED | OUTPATIENT
Start: 2021-01-01 | End: 2021-01-01

## 2021-01-01 RX ORDER — PHENAZOPYRIDINE HYDROCHLORIDE 100 MG/1
100 TABLET, FILM COATED ORAL 3 TIMES DAILY PRN
Status: DISCONTINUED | OUTPATIENT
Start: 2021-01-01 | End: 2021-01-01

## 2021-01-01 RX ORDER — HEPARIN SODIUM 1000 [USP'U]/ML
INJECTION, SOLUTION INTRAVENOUS; SUBCUTANEOUS
Status: COMPLETED
Start: 2021-01-01 | End: 2021-01-01

## 2021-01-01 RX ORDER — FUROSEMIDE 10 MG/ML
40 INJECTION INTRAMUSCULAR; INTRAVENOUS ONCE
Status: COMPLETED | OUTPATIENT
Start: 2021-01-01 | End: 2021-01-01

## 2021-01-01 RX ORDER — LORAZEPAM 2 MG/ML
2 INJECTION INTRAMUSCULAR
Status: DISCONTINUED | OUTPATIENT
Start: 2021-01-01 | End: 2021-01-01

## 2021-01-01 RX ORDER — VANCOMYCIN HYDROCHLORIDE 125 MG/1
125 CAPSULE ORAL DAILY
Qty: 10 CAPSULE | Refills: 0 | Status: SHIPPED | OUTPATIENT
Start: 2021-01-01 | End: 2021-01-01 | Stop reason: ALTCHOICE

## 2021-01-01 RX ORDER — FOLIC ACID 1 MG/1
1 TABLET ORAL DAILY
Qty: 30 TABLET | Refills: 0 | Status: SHIPPED | OUTPATIENT
Start: 2021-01-01

## 2021-01-01 RX ORDER — FUROSEMIDE 10 MG/ML
20 INJECTION INTRAMUSCULAR; INTRAVENOUS
Status: DISCONTINUED | OUTPATIENT
Start: 2021-01-01 | End: 2021-01-01

## 2021-01-01 RX ORDER — PANTOPRAZOLE SODIUM 40 MG/1
40 TABLET, DELAYED RELEASE ORAL
Status: DISCONTINUED | OUTPATIENT
Start: 2021-01-01 | End: 2021-01-01

## 2021-01-01 RX ORDER — SODIUM CHLORIDE 9 MG/ML
INJECTION, SOLUTION INTRAVENOUS ONCE
Status: COMPLETED | OUTPATIENT
Start: 2021-01-01 | End: 2021-01-01

## 2021-01-01 RX ORDER — FOLIC ACID 1 MG/1
1 TABLET ORAL DAILY
Status: DISCONTINUED | OUTPATIENT
Start: 2021-01-01 | End: 2021-01-01

## 2021-01-01 RX ORDER — SODIUM CHLORIDE 9 MG/ML
INJECTION, SOLUTION INTRAVENOUS CONTINUOUS
Status: DISCONTINUED | OUTPATIENT
Start: 2021-01-01 | End: 2021-01-01

## 2021-01-01 RX ORDER — MAGNESIUM SULFATE HEPTAHYDRATE 40 MG/ML
2 INJECTION, SOLUTION INTRAVENOUS ONCE
Status: COMPLETED | OUTPATIENT
Start: 2021-01-01 | End: 2021-01-01

## 2021-01-01 RX ORDER — LORAZEPAM 1 MG/1
2 TABLET ORAL
Status: DISCONTINUED | OUTPATIENT
Start: 2021-01-01 | End: 2021-01-01

## 2021-01-01 RX ORDER — FUROSEMIDE 10 MG/ML
20 INJECTION INTRAMUSCULAR; INTRAVENOUS EVERY 12 HOURS
Status: DISCONTINUED | OUTPATIENT
Start: 2021-01-01 | End: 2021-01-01

## 2021-01-01 RX ORDER — DIAZEPAM 5 MG/1
5 TABLET ORAL DAILY PRN
Qty: 30 TABLET | Refills: 0 | Status: ON HOLD | OUTPATIENT
Start: 2021-01-01 | End: 2021-01-01

## 2021-01-01 RX ORDER — HYDROMORPHONE HYDROCHLORIDE 1 MG/ML
1 INJECTION, SOLUTION INTRAMUSCULAR; INTRAVENOUS; SUBCUTANEOUS ONCE
Status: COMPLETED | OUTPATIENT
Start: 2021-01-01 | End: 2021-01-01

## 2021-01-01 RX ORDER — HEPARIN SODIUM 5000 [USP'U]/ML
5000 INJECTION, SOLUTION INTRAVENOUS; SUBCUTANEOUS EVERY 12 HOURS SCHEDULED
Status: DISCONTINUED | OUTPATIENT
Start: 2021-01-01 | End: 2021-01-01

## 2021-01-01 RX ORDER — SODIUM POLYSTYRENE SULFONATE 4.1 MEQ/G
30 POWDER, FOR SUSPENSION ORAL; RECTAL ONCE
Status: COMPLETED | OUTPATIENT
Start: 2021-01-01 | End: 2021-01-01

## 2021-01-01 RX ORDER — CIPROFLOXACIN 500 MG/1
500 TABLET, FILM COATED ORAL EVERY 12 HOURS SCHEDULED
Status: DISCONTINUED | OUTPATIENT
Start: 2021-01-01 | End: 2021-01-01

## 2021-01-01 RX ORDER — POTASSIUM CHLORIDE 20 MEQ/1
40 TABLET, EXTENDED RELEASE ORAL ONCE
Status: DISCONTINUED | OUTPATIENT
Start: 2021-01-01 | End: 2021-01-01 | Stop reason: ALTCHOICE

## 2021-01-01 RX ORDER — SODIUM CHLORIDE 9 MG/ML
INJECTION, SOLUTION INTRAVENOUS ONCE
Status: DISCONTINUED | OUTPATIENT
Start: 2021-01-01 | End: 2021-01-01

## 2021-01-01 RX ORDER — FOLIC ACID 1 MG/1
1 TABLET ORAL DAILY
Qty: 30 TABLET | Refills: 0 | Status: ON HOLD | OUTPATIENT
Start: 2021-01-01 | End: 2021-01-01

## 2021-01-01 RX ORDER — DIAZEPAM 5 MG/1
5 TABLET ORAL DAILY PRN
Qty: 30 TABLET | Refills: 0 | OUTPATIENT
Start: 2021-01-01

## 2021-01-01 RX ORDER — MIDAZOLAM HYDROCHLORIDE 1 MG/ML
INJECTION INTRAMUSCULAR; INTRAVENOUS
Status: COMPLETED
Start: 2021-01-01 | End: 2021-01-01

## 2021-01-01 RX ORDER — SODIUM CHLORIDE AND POTASSIUM CHLORIDE .9; .15 G/100ML; G/100ML
SOLUTION INTRAVENOUS CONTINUOUS
Status: DISCONTINUED | OUTPATIENT
Start: 2021-01-01 | End: 2021-01-01

## 2021-01-01 RX ORDER — LORAZEPAM 1 MG/1
1 TABLET ORAL
Status: DISCONTINUED | OUTPATIENT
Start: 2021-01-01 | End: 2021-01-01

## 2021-01-01 RX ORDER — ALBUMIN (HUMAN) 12.5 G/50ML
25 SOLUTION INTRAVENOUS EVERY 8 HOURS
Status: DISCONTINUED | OUTPATIENT
Start: 2021-01-01 | End: 2021-01-01

## 2021-01-01 RX ORDER — POLYETHYLENE GLYCOL 3350, SODIUM CHLORIDE, SODIUM BICARBONATE, POTASSIUM CHLORIDE 420; 11.2; 5.72; 1.48 G/4L; G/4L; G/4L; G/4L
4 POWDER, FOR SOLUTION ORAL ONCE
Qty: 1 BOTTLE | Refills: 0 | Status: SHIPPED | OUTPATIENT
Start: 2021-01-01 | End: 2021-01-01

## 2021-01-01 RX ORDER — ALBUMIN (HUMAN) 12.5 G/50ML
100 SOLUTION INTRAVENOUS AS NEEDED
Status: DISCONTINUED | OUTPATIENT
Start: 2021-01-01 | End: 2021-01-01

## 2021-01-01 RX ORDER — VANCOMYCIN HYDROCHLORIDE 125 MG/1
125 CAPSULE ORAL DAILY
Status: DISCONTINUED | OUTPATIENT
Start: 2021-01-01 | End: 2021-01-01

## 2021-01-01 RX ORDER — ACETAMINOPHEN 325 MG/1
650 TABLET ORAL ONCE
Status: COMPLETED | OUTPATIENT
Start: 2021-01-01 | End: 2021-01-01

## 2021-01-01 RX ORDER — IBUPROFEN 400 MG/1
400 TABLET ORAL ONCE
Status: COMPLETED | OUTPATIENT
Start: 2021-01-01 | End: 2021-01-01

## 2021-01-01 RX ORDER — SODIUM BICARBONATE 150 MEQ/1,000 ML IN DEXTROSE 5 % INTRAVENOUS
83 SOLUTION CONTINUOUS
Status: DISCONTINUED | OUTPATIENT
Start: 2021-01-01 | End: 2021-01-01

## 2021-01-01 RX ORDER — HYDROMORPHONE HYDROCHLORIDE 1 MG/ML
0.2 INJECTION, SOLUTION INTRAMUSCULAR; INTRAVENOUS; SUBCUTANEOUS EVERY 2 HOUR PRN
Status: DISCONTINUED | OUTPATIENT
Start: 2021-01-01 | End: 2021-01-01

## 2021-01-01 RX ORDER — LISINOPRIL 40 MG/1
40 TABLET ORAL DAILY
Status: DISCONTINUED | OUTPATIENT
Start: 2021-01-01 | End: 2021-01-01

## 2021-01-01 RX ORDER — ASPIRIN 325 MG
325 TABLET ORAL DAILY
COMMUNITY

## 2021-01-01 RX ORDER — HYDROMORPHONE HYDROCHLORIDE 1 MG/ML
0.5 INJECTION, SOLUTION INTRAMUSCULAR; INTRAVENOUS; SUBCUTANEOUS ONCE
Status: DISCONTINUED | OUTPATIENT
Start: 2021-01-01 | End: 2021-01-01

## 2021-01-01 RX ORDER — MORPHINE SULFATE 2 MG/ML
2 INJECTION, SOLUTION INTRAMUSCULAR; INTRAVENOUS ONCE
Status: COMPLETED | OUTPATIENT
Start: 2021-01-01 | End: 2021-01-01

## 2021-01-01 RX ORDER — POTASSIUM CHLORIDE 20 MEQ/1
20 TABLET, EXTENDED RELEASE ORAL ONCE
Status: COMPLETED | OUTPATIENT
Start: 2021-01-01 | End: 2021-01-01

## 2021-01-01 RX ORDER — ZOLPIDEM TARTRATE 5 MG/1
5 TABLET ORAL NIGHTLY PRN
Status: DISCONTINUED | OUTPATIENT
Start: 2021-01-01 | End: 2021-01-01

## 2021-01-01 RX ORDER — HYDROMORPHONE HYDROCHLORIDE 1 MG/ML
0.4 INJECTION, SOLUTION INTRAMUSCULAR; INTRAVENOUS; SUBCUTANEOUS EVERY 2 HOUR PRN
Status: DISCONTINUED | OUTPATIENT
Start: 2021-01-01 | End: 2021-01-01

## 2021-01-01 RX ORDER — LIDOCAINE HYDROCHLORIDE 20 MG/ML
INJECTION, SOLUTION EPIDURAL; INFILTRATION; INTRACAUDAL; PERINEURAL
Status: COMPLETED
Start: 2021-01-01 | End: 2021-01-01

## 2021-01-01 RX ORDER — HYDROMORPHONE HYDROCHLORIDE 1 MG/ML
0.8 INJECTION, SOLUTION INTRAMUSCULAR; INTRAVENOUS; SUBCUTANEOUS EVERY 2 HOUR PRN
Status: DISCONTINUED | OUTPATIENT
Start: 2021-01-01 | End: 2021-01-01

## 2021-01-01 RX ORDER — HEPARIN SODIUM 1000 [USP'U]/ML
1.5 INJECTION, SOLUTION INTRAVENOUS; SUBCUTANEOUS ONCE
Status: DISCONTINUED | OUTPATIENT
Start: 2021-01-01 | End: 2021-01-01

## 2021-01-01 RX ORDER — POTASSIUM CHLORIDE 14.9 MG/ML
20 INJECTION INTRAVENOUS ONCE
Status: COMPLETED | OUTPATIENT
Start: 2021-01-01 | End: 2021-01-01

## 2021-01-01 RX ORDER — LISINOPRIL 40 MG/1
40 TABLET ORAL DAILY
COMMUNITY

## 2021-01-01 RX ORDER — PROCHLORPERAZINE EDISYLATE 5 MG/ML
5 INJECTION INTRAMUSCULAR; INTRAVENOUS EVERY 8 HOURS PRN
Status: DISCONTINUED | OUTPATIENT
Start: 2021-01-01 | End: 2021-01-01

## 2021-01-01 RX ORDER — CIPROFLOXACIN 500 MG/1
500 TABLET, FILM COATED ORAL EVERY 12 HOURS SCHEDULED
Qty: 10 TABLET | Refills: 0 | Status: SHIPPED | OUTPATIENT
Start: 2021-01-01 | End: 2021-01-01

## 2021-01-01 RX ORDER — MORPHINE SULFATE 2 MG/ML
2 INJECTION, SOLUTION INTRAMUSCULAR; INTRAVENOUS EVERY 4 HOURS PRN
Status: DISCONTINUED | OUTPATIENT
Start: 2021-01-01 | End: 2021-01-01

## 2021-01-01 RX ORDER — MORPHINE SULFATE 2 MG/ML
1 INJECTION, SOLUTION INTRAMUSCULAR; INTRAVENOUS EVERY 2 HOUR PRN
Status: DISCONTINUED | OUTPATIENT
Start: 2021-01-01 | End: 2021-01-01

## 2021-01-01 RX ORDER — LORAZEPAM 2 MG/ML
1 INJECTION INTRAMUSCULAR EVERY 6 HOURS PRN
Status: DISCONTINUED | OUTPATIENT
Start: 2021-01-01 | End: 2021-01-01

## 2021-01-15 PROBLEM — F10.21 HISTORY OF ALCOHOL DEPENDENCE (HCC): Status: ACTIVE | Noted: 2021-01-01

## 2021-01-15 PROBLEM — R17 TOTAL BILIRUBIN, ELEVATED: Status: ACTIVE | Noted: 2021-01-01

## 2021-01-15 PROBLEM — R39.89 DARK YELLOW-COLORED URINE: Status: ACTIVE | Noted: 2021-01-01

## 2021-01-15 PROBLEM — R17 SCLERAL ICTERUS: Status: ACTIVE | Noted: 2021-01-01

## 2021-01-16 LAB
ALT SERPL-CCNC: 46 UNITS/L
AST SERPL-CCNC: 55 UNITS/L
BUN SERPL-MCNC: 12 MG/DL
BUN/CREAT SERPL: 9.5
CALCIUM SERPL-MCNC: 9.2 MG/DL
CHLORIDE SERPL-SCNC: 112 MMOL/L
CHOLEST SERPL-MCNC: 157 MG/DL
CHOLEST/HDLC SERPL: 34 {RATIO}
CO2 SERPL-SCNC: 23 MMOL/L
GLUCOSE SERPL-MCNC: 90 MG/DL
POTASSIUM SERPL-SCNC: 3.7 MMOL/L
SODIUM SERPL-SCNC: 142 MMOL/L
TRIGL SERPL-MCNC: 155 MG/DL
VLDLC SERPL CALC-MCNC: 31 MG/DL

## 2021-02-04 ENCOUNTER — OFFICE VISIT (OUTPATIENT)
Dept: CARDIOLOGY | Age: 56
End: 2021-02-04

## 2021-02-04 VITALS
WEIGHT: 293 LBS | HEIGHT: 67 IN | SYSTOLIC BLOOD PRESSURE: 110 MMHG | OXYGEN SATURATION: 99 % | DIASTOLIC BLOOD PRESSURE: 64 MMHG | HEART RATE: 65 BPM | BODY MASS INDEX: 45.99 KG/M2

## 2021-02-04 DIAGNOSIS — I25.10 CORONARY ARTERY DISEASE INVOLVING NATIVE CORONARY ARTERY OF NATIVE HEART WITHOUT ANGINA PECTORIS: Primary | ICD-10-CM

## 2021-02-04 DIAGNOSIS — I10 ESSENTIAL HYPERTENSION: ICD-10-CM

## 2021-02-04 DIAGNOSIS — I21.4 NSTEMI (NON-ST ELEVATED MYOCARDIAL INFARCTION) (CMD): ICD-10-CM

## 2021-02-04 PROCEDURE — 3074F SYST BP LT 130 MM HG: CPT | Performed by: INTERNAL MEDICINE

## 2021-02-04 PROCEDURE — 99214 OFFICE O/P EST MOD 30 MIN: CPT | Performed by: INTERNAL MEDICINE

## 2021-02-04 PROCEDURE — 3078F DIAST BP <80 MM HG: CPT | Performed by: INTERNAL MEDICINE

## 2021-02-04 ASSESSMENT — PATIENT HEALTH QUESTIONNAIRE - PHQ9
CLINICAL INTERPRETATION OF PHQ2 SCORE: NO FURTHER SCREENING NEEDED
2. FEELING DOWN, DEPRESSED OR HOPELESS: NOT AT ALL
CLINICAL INTERPRETATION OF PHQ9 SCORE: NO FURTHER SCREENING NEEDED
1. LITTLE INTEREST OR PLEASURE IN DOING THINGS: NOT AT ALL
SUM OF ALL RESPONSES TO PHQ9 QUESTIONS 1 AND 2: 0
SUM OF ALL RESPONSES TO PHQ9 QUESTIONS 1 AND 2: 0

## 2021-02-24 ENCOUNTER — CLINICAL ABSTRACT (OUTPATIENT)
Dept: CARDIOLOGY | Age: 56
End: 2021-02-24

## 2021-02-24 LAB — SARS-COV-2 RNA RESP QL NAA+PROBE: NOT DETECTED

## 2021-02-25 PROBLEM — R17 JAUNDICE: Status: ACTIVE | Noted: 2021-01-01

## 2021-02-25 PROBLEM — R50.9 FEVER, UNSPECIFIED FEVER CAUSE: Status: ACTIVE | Noted: 2021-01-01

## 2021-02-25 PROBLEM — D64.9 ANEMIA, UNSPECIFIED TYPE: Status: ACTIVE | Noted: 2021-01-01

## 2021-02-25 PROBLEM — D64.9 ANEMIA: Status: ACTIVE | Noted: 2021-01-01

## 2021-02-25 PROBLEM — N17.9 ACUTE RENAL FAILURE, UNSPECIFIED ACUTE RENAL FAILURE TYPE (HCC): Status: ACTIVE | Noted: 2021-01-01

## 2021-02-25 PROBLEM — E87.1 HYPONATREMIA: Status: ACTIVE | Noted: 2021-01-01

## 2021-02-25 PROBLEM — D69.6 THROMBOCYTOPENIA (HCC): Status: ACTIVE | Noted: 2021-01-01

## 2021-02-25 PROBLEM — E87.20 METABOLIC ACIDOSIS: Status: ACTIVE | Noted: 2021-01-01

## 2021-02-25 PROBLEM — N39.0 UTI (URINARY TRACT INFECTION): Status: ACTIVE | Noted: 2021-01-01

## 2021-02-25 PROBLEM — E87.2 METABOLIC ACIDOSIS: Status: ACTIVE | Noted: 2021-01-01

## 2021-02-25 PROBLEM — N17.9 ACUTE KIDNEY INJURY (HCC): Status: ACTIVE | Noted: 2021-01-01

## 2021-02-25 PROBLEM — N17.9 ACUTE RENAL FAILURE (ARF) (HCC): Status: ACTIVE | Noted: 2021-01-01

## 2021-02-25 NOTE — ED INITIAL ASSESSMENT (HPI)
zenobia lee used # [id number]    Patient states his body hurts all over and he has had the chills. Wife states jett was in here in December for ETOH issues and he has not felt the same since.  Denies drinking since    Patients wife also repo

## 2021-02-25 NOTE — ED NOTES
Orders for admission, patient is aware of plan and ready to go upstairs. Any questions, please call ED RN Daria Saxena  at extension [phone]. Type of COVID test sent:Rapid    COVID Suspicion level: Low    Titratable drug(s) infusing: . 9NS bolus  Rate:    LOC at t

## 2021-02-25 NOTE — PROGRESS NOTES
[facility] Pharmacy Note:  Antibiotic Dose Adjustment    Piperacillin/tazobactam (Zosyn) 3.375g IV once was ordered for [employer]. Body mass index is 49.92 kg/m².   Wt Readings from Last 6 Encounters:  02/25/21 : 136.1 kg (300 lb)  01/15/21 : 131.5 kg (290

## 2021-02-25 NOTE — ED NOTES
Pt presents for c/o urinary retention for the past 3 days. Pt reports normal po intake, but states he is only urinating a little bit at a time. Pt also states urine is orange. Pt reports c/o body aches and chills for the past few days.  Pt denies known COVI

## 2021-02-26 NOTE — CONSULTS
[place] [facility] - [facility] ID CONSULT NOTE    Carleen Garcia Patient Status:  Inpatient    1965 MRN [id number]   Location [facility] 2W/SW Attending Rebeca Samayoa MD   [facility] Day [facility] PCP Nena Rosales MD       Reason for Consultat •  norepinephrine (LEVOPHED) 4 mg/250 ml premix infusion, 0.5-30 mcg/min, Intravenous, Continuous  •  [START ON 2/27/2021] Pantoprazole Sodium (PROTONIX) EC tab 40 mg, 40 mg, Oral, QAM AC  •  Thiamine HCl (Vitamin B-1) tab 100 mg, 100 mg, Oral, Daily  •  f RBC 2.96*   HGB 9.1*   HCT 26.9*   MCV 90.9   MCH 30.7   MCHC 33.8   RDW 17.8*   NEPRELIM 6.67   WBC 9.6   PLT 62.0*     Recent Labs   Lab 02/25/21  1555 02/26/21  0543   * 96   BUN 31* 37*   CREATSERUM 2.92* 4.02*   GFRAA 27* 18*   GFRNAA 23* 16* Vinicio Gillis MD   [facility]  [phone]  2/26/2021

## 2021-02-26 NOTE — SEPSIS REASSESSMENT
[facility]    Sepsis Reassessment Note    /56 (BP Location: Right arm)   Pulse 81   Temp 97.6 °F (36.4 °C) (Oral)   Resp 23   Ht 5' 7\" (1.702 m)   Wt 297 lb 9.9 oz (135 kg)   SpO2 97%   BMI 46.61 kg/m²      12:36 PM    Cardiac:  Re

## 2021-02-26 NOTE — H&P
[facility]    PATIENT'S NAME: Mikal Prescott PHYSICIAN: Bernadine Garcia MD   PATIENT ACCOUNT#:   [de-identified]    LOCATION:  [place] [address]  MEDICAL RECORD #:   [id number]       YOB: 1965  ADMISSION DATE:       02/25/20 SURGICAL HISTORY:  None. MEDICATIONS:  Please see medication reconciliation list.  Noted to be on metoprolol, lisinopril, and atorvastatin. ALLERGIES:  No known drug allergies. FAMILY HISTORY:  Mother had hypertension.   Sister had hypertension and blood and urine cultures. Start him on IV Zosyn. Fall precautions. The patient had some drop in his hemoglobin. His stool was trace positive for Hemoccult blood. We will obtain gastroenterology consult, Dr. Sixto Rachel was notified.   Again, monitor his

## 2021-02-26 NOTE — CONSULTS
[facility]    Report of Consultation    Jesus Rasmussen Patient Status:  Inpatient    1965 MRN [id number]   Location [facility] 2W/SW Attending Demar Page MD   [facility] Day # 1 PCP Nena Rosales MD     Date of Admission:   Family History  Family History   Problem Relation Age of Onset   • Hypertension Mother    • Hypertension Sister    • Heart Disorder Sister    • Diabetes Sister    • Cancer Neg        Social History  Social History    Tobacco Use      Smoking status: JVD  Chest: Good air exchange to both lungs              Symmetrical expansion of chest wall               Equal bilateral breath sounds               Clear to auscultation bilaterally    Heart : S1 s2 regular tachy with no g/m   abd : soft and benign , no follow-up studies are advised.     Dictated by (CST): Usha Ventura MD on 2/25/2021 at 4:15 PM     Finalized by (CST): Usha Ventura MD on 2/25/2021 at 4:16 PM               Impression:       1-severe sepsis secondary to UTI     Bacteremia with positive b

## 2021-02-26 NOTE — PROGRESS NOTES
[facility]     Gastroenterology Progress Note    Liz Salinas Patient Status:  Inpatient    1965 MRN [id number]   Location [facility] 2W/SW Attending Hussein Corral MD   Hosp Day # 1 PCP MD Nadya Zazueta noted trace + hemoccult stool report and mild anemia. Anemia likey multifactorial given kidney disease, urosepsis and bone marrow suppression from ETOH.      Ultimately, this patient will require a bi-directional however no current report of GI bleeding the nonobstructing bilateral intrarenal calculi.     Dictated by (CST): Lorrie Elaine MD on 2/25/2021 at 5:15 PM     Finalized by (CST): Lorrie Elaine MD on 2/25/2021 at 5:22 PM          Xr Chest Ap Portable  (cpt=71045)    Result Date: 2/25

## 2021-02-26 NOTE — PLAN OF CARE
Problem: Patient Centered Care  Goal: Patient preferences are identified and integrated in the patient's plan of care  Description: Interventions:  - What would you like us to know as we care for you?  \" I am Mohawk speaking primarily and would like the Consider OT/PT consult to assist with strengthening/mobility  - Encourage toileting schedule  Outcome: Progressing     Problem: DISCHARGE PLANNING  Goal: Discharge to home or other facility with appropriate resources  Description: INTERVENTIONS:  - Identif Problem: HEMATOLOGIC - ADULT  Goal: Maintains hematologic stability  Description: INTERVENTIONS  - Assess for signs and symptoms of bleeding or hemorrhage  - Monitor labs and vital signs for trends  - Administer supportive blood products/factors, fluids

## 2021-02-26 NOTE — PLAN OF CARE
Pt oriented x4, [place]alander speaking. Admitted to PCCU around 0815 for low BPs on the floor. Low-dose levophed started at 0913. Pt asymptomatic. Very minimal urine output, pt still feels \"pressure\" around bladder.  500ml bolus given x2 and IV fluids increased period  Description: INTERVENTIONS  - Monitor WBC  - Administer growth factors as ordered  - Implement neutropenic guidelines  2/26/2021 1419 by Shun Child, RN  Outcome: Progressing  2/26/2021 1418 by Shun Child, RN  Outcome: Progressing     Problem: S Progressing     Problem: GENITOURINARY - ADULT  Goal: Absence of urinary retention  Description: INTERVENTIONS:  - Assess patient’s ability to void and empty bladder  - Monitor intake/output and perform bladder scan as needed  - Follow urinary retention pr of internal bleeding  - Monitor lab trends  - Patient is to report abnormal signs of bleeding to staff  - Avoid use of toothpicks and dental floss  - Use electric shaver for shaving  - Use soft bristle tooth brush  - Limit straining and forceful nose blowi

## 2021-02-26 NOTE — PROGRESS NOTES
[facility] Pharmacy Note: Antimicrobial Weight Based Dose Adjustment for: piperacillin/tazobactam ([place])    Nga Pinon is a 55year old patient who has been prescribed piperacillin/tazobactam (ZOSYN) 3.375g every 8 hours.     Estimated Creatinine Clearance: 2

## 2021-02-26 NOTE — VASCULAR ACCESS
V[facility] Consult Note  2/26/2021     Reviewed H&P and current condition. Past Medical History:  03-: Lipid screening      Comment:  Per [employer]  No date: Alcohol abuse  No date:  Anxiety  No date: Essential hypertension  No date: High blood p changes.     [place] RN, [facility]

## 2021-02-26 NOTE — ED PROVIDER NOTES
Patient Seen in: [facility] 5sw/se    History   Patient presents with: Body ache and/or chills    Stated Complaint: Fatigue     HPI    Patient complains of fatigue, fever, jaundice, loss appettie over past several days.   Stopped drinking 6 weeks Exam     ED Triage Vitals [02/25/21 1439]   /73   Pulse 97   Resp 18   Temp (!) 100.6 °F (38.1 °C)   Temp src Oral   SpO2 97 %   O2 Device None (Room air)       Current:/59 (BP Location: Right arm)   Pulse 95   Temp 99 °F (37.2 °C) (Oral)   Res other components within normal limits   LDH - Abnormal; Notable for the following components:     (*)     All other components within normal limits   PROTHROMBIN TIME (PT) - Abnormal; Notable for the following components:    PT 21.0 (*)     INR 1.85 Increased fluid in the small bowel suggestive of enteritis. Clinical correlation recommended. 2. Moderately distended gallbladder, similar appearance to previous ultrasound examination with no additional abnormality seen.  3. Multiple nonobstructing bilate Yes    Acute renal failure (ARF) ([facility] N17.9 2/25/2021 Yes    Acute renal failure, unspecified acute renal failure type ([facility] N17.9 2/25/2021     Anemia D64.9 2/25/2021 Yes    Anemia, unspecified type D64.9 2/25/2021     Fever, unspecified fever cause R50.

## 2021-02-26 NOTE — CONSULTS
E[facility] Choe [facility]   Gastroenterology Consultation Note     Carmine Padilla  Patient Status:    Inpatient  Date of Admission:         2/25/2021, Hospital day #0  Attending:   Hudson Fernandes MD  PCP:     Froylan Francois.  MD Bobby    [facility], [facility] 4.5 g in dextrose 5% 100 mL IVPB-ADDV, 4.5 g, Intravenous, Q8H  •  0.9% NaCl infusion, , Intravenous, Continuous  •  acetaminophen (TYLENOL) tab 650 mg, 650 mg, Oral, Q6H PRN  •  ondansetron HCl (ZOFRAN) injection 4 mg, 4 mg, Intravenous, Q6H PRN  •  Proch calculi. Dictated by (CST): Allen Elaine MD on 2/25/2021 at 5:15 PM     Finalized by (CST): Allen Elaine MD on 2/25/2021 at 5:22 PM          Xr Chest Ap Portable  (cpt=71045)    Result Date: 2/25/2021  CONCLUSION:  1.  Mild cardiome function  -EGD and colonoscopy in the future  -viral hepatitis serologies  -monitor liver enzymes    Patricia Perez MD  [facility], [facility] - Gastroenterology  2/25/2021

## 2021-02-27 NOTE — PROGRESS NOTES
E[facility]     Gastroenterology Progress Note    Israel Norman Patient Status:  Inpatient    1965 MRN [id number]   Location [facility] 2W/SW Attending [place] Meadows MD   [facility] Day # 2 PCP MD Sienna Vogt Hep B vaccination electively. Additionally, noted trace + hemoccult stool report and mild anemia. Anemia likey multifactorial given kidney disease, urosepsis and bone marrow suppression from ETOH.      Ultimately, this patient will require a bi-direction 02/28/2020     02/25/2021    PSA 0.6 01/09/2017    DDIMER <0.27 12/08/2017    MG 1.4 (L) 02/27/2021    PHOS 4.3 02/27/2021    TROP <0.045 02/25/2021    B12 564 01/09/2017    ETOH 145 (H) 12/03/2020       Ct Abdomen+pelvis(cpt=74176)    Result Date:

## 2021-02-27 NOTE — PLAN OF CARE
Pt to be transferred to Critical care per Dr. Gibbs Come orders. Report given to CCU RN. Transfer endorsed to day shift RN's.

## 2021-02-27 NOTE — PROGRESS NOTES
[facility]    Progress Note      Assessment and Plan:   1. Klebsiella pneumonia bacteremia with urinary source    Recommendations: Ongoing antibiotic as per ID    2.   Acute kidney injury–may ultimately require hemodialysis although urine o 02/27/2021    ALB 2.1 02/27/2021    ALKPHO 100 02/27/2021    BILT 4.4 02/27/2021    TP 5.8 02/27/2021    AST 94 02/27/2021    ALT 60 02/27/2021    MG 1.4 02/27/2021    PHOS 4.3 02/27/2021     Chest x-ray–decreased interstitial edema    Nhi Sanchez MD

## 2021-02-27 NOTE — PLAN OF CARE
Problem: Patient Centered Care  Goal: Patient preferences are identified and integrated in the patient's plan of care  Description: Interventions:  - What would you like us to know as we care for you?  \" I am Tajik speaking primarily and would like the Consider OT/PT consult to assist with strengthening/mobility  - Encourage toileting schedule  Outcome: Progressing     Problem: DISCHARGE PLANNING  Goal: Discharge to home or other facility with appropriate resources  Description: INTERVENTIONS:  - Identif Problem: HEMATOLOGIC - ADULT  Goal: Maintains hematologic stability  Description: INTERVENTIONS  - Assess for signs and symptoms of bleeding or hemorrhage  - Monitor labs and vital signs for trends  - Administer supportive blood products/factors, fluids

## 2021-02-27 NOTE — PROGRESS NOTES
[place] [facility]    Progress Note    Kelvin Sicard Patient Status:  Inpatient    1965 MRN [id number]   Location [facility] 2W/SW Attending John Varghese, [address] [facility] Day # 2 PCP Nena Rosales MD       Subjective:   Kelvin Sicard i abnormal bruising noted  Back/Spine: no abnormalities noted  Musculoskeletal: full ROM all extremities good strength  no deformities  Extremities: 2+ edema  Neurological:  Grossly normal    Results:     Laboratory Data:  Lab Results   Component Value Date Finalized by (CST): Torri Lee MD on 2/27/2021 at 7:52 AM              ASSESSMENT/PLAN:   Assessment     1  abeba creat still going up   likely due to atn from sepsis   cut ivf down   try albumin and lasix to generate urine ouput    2  Cirrhosis from eto

## 2021-02-27 NOTE — CONSULTS
[facility]    PATIENT'S NAME: Savanah Vazquez   ATTENDING PHYSICIAN: Hector Robertson MD   CONSULTING PHYSICIAN: Sam Evans.  Sheyla Julio MD   PATIENT ACCOUNT#:   [id number]    LOCATION:  [address] [place] [address] RECORD #:   [id number]       DATE OF BIRTH: in the hospital, he is on cefepime, he was on low dose of Levophed, and IV fluids at 150 mL/h. No blood pressure medications currently. SOCIAL HISTORY:  He is a former smoker, former drinker. He smoked until about 2 years ago. Does not use drugs.   Cu replacement therapy. I discussed with Dr. Humble Salazar. Dictated By Sam Evans.  Sheyla Julio MD  d: 02/27/2021 03:20:52  t: 02/27/2021 03:48:54  Job [id number]  DNV/

## 2021-02-27 NOTE — PROGRESS NOTES
[place] [facility] - [facility] ID PROGRESS NOTE    Juni Nick Patient Status:  Inpatient    1965 MRN [id number]   Location [facility] 2W/SW Attending Roberto Holloway, [address] [facility] [address] Day # 2 PCP Nena Rosales MD     Subjective:  R Metabolic acidosis     Acute kidney injury ([facility]     Acute renal failure (ARF) (HCC)     Thrombocytopenia (HCC)     Anemia     Hyponatremia     Jaundice     Fever, unspecified fever cause     Anemia, unspecified type     Acute renal failure, unspecified

## 2021-02-27 NOTE — PLAN OF CARE
Pt alert and oriented x 4, Nepalese speaking, language line utilized. Levo gtt titrated to maintain BP sys over 90 or MAP over 60. No complaints of pain overnight. Vega in place, urine keira. Bed in lowest position, call light within reach.      Problem: Pa assessment.  - Educate pt/family on patient safety including physical limitations  - Instruct pt to call for assistance with activity based on assessment  - Modify environment to reduce risk of injury  - Provide assistive devices as appropriate  - Consider as ordered  - Monitor response to electrolyte replacements, including rhythm and repeat lab results as appropriate  - Fluid restriction as ordered  - Instruct patient on fluid and nutrition restrictions as appropriate  Outcome: Progressing     Problem: HEM

## 2021-02-28 NOTE — PLAN OF CARE
Problem: Patient Centered Care  Goal: Patient preferences are identified and integrated in the patient's plan of care  Description: Interventions:  - What would you like us to know as we care for you?  \" I am Telugu speaking primarily and would like the Consider OT/PT consult to assist with strengthening/mobility  - Encourage toileting schedule  Outcome: Progressing     Problem: DISCHARGE PLANNING  Goal: Discharge to home or other facility with appropriate resources  Description: INTERVENTIONS:  - Identif Problem: HEMATOLOGIC - ADULT  Goal: Maintains hematologic stability  Description: INTERVENTIONS  - Assess for signs and symptoms of bleeding or hemorrhage  - Monitor labs and vital signs for trends  - Administer supportive blood products/factors, fluids

## 2021-02-28 NOTE — PROGRESS NOTES
[facility]    Progress Note      Assessment and Plan:   1. Klebsiella pneumonia bacteremia with urinary source    Recommendations: Ongoing antibiotic as per ID. We will sign off. Please call if we can help further.     2.  Acute kidney in edema    Alex Patel MD  Medical Director, Critical Care, [address] [facility]  Medical Director, [address] [place] [address]. [phone]  Pager: [phone]

## 2021-02-28 NOTE — PLAN OF CARE
Pt transferred to [facility] at 1530. Pt is A&Ox4, primarily Greenlandic speaking. VSS on room air. Denies pain and shortness of breath. IV fluids infusing. On low fiber/soft diet. Vega in place, draining adequately. Up with 1 assist and the walker.  Call light withi INTERVENTIONS:  - Assess pt frequently for physical needs  - Identify cognitive and physical deficits and behaviors that affect risk of falls.   - Ellinger fall precautions as indicated by assessment.  - Educate pt/family on patient safety including physic maintained within normal limits  Description: INTERVENTIONS:  - Monitor labs and rhythm and assess patient for signs and symptoms of electrolyte imbalances  - Administer electrolyte replacement as ordered  - Monitor response to electrolyte replacements, in

## 2021-02-28 NOTE — PROGRESS NOTES
[facility] - [facility]    Progress Note    Jesus Rasmussen Patient Status:  Inpatient    1965 MRN [id number]   Location [facility] 2W/SW Attending Ronold Castleman, MD   Hosp Day # 3 PCP Nena Rosales MD       Subjective:   Jesus Rasmussen i 2.830 02/28/2020     02/25/2021    PSA 0.6 01/09/2017    DDIMER <0.27 12/08/2017    MG 1.7 02/28/2021    PHOS 3.8 02/28/2021    TROP <0.045 02/25/2021    B12 564 01/09/2017    ETOH 145 (H) 12/03/2020       Xr Chest Ap Portable  (cpt=71045)    Result Alonso Layne MD

## 2021-02-28 NOTE — PLAN OF CARE
Problem: SAFETY ADULT - FALL  Goal: Free from fall injury  Description: INTERVENTIONS:  - Assess pt frequently for physical needs  - Identify cognitive and physical deficits and behaviors that affect risk of falls.   - Esmond fall precautions as indica

## 2021-02-28 NOTE — PROGRESS NOTES
[place] [facility] - [facility] ID PROGRESS NOTE    Chetan Ramos Patient Status:  Inpatient    1965 MRN [id number]   Location [facility] 2W/SW Attending Deepika Osullivan, [address] [facility] Se Day # 3 PCP Nena Rosales MD     Subjective:  R Dark yellow-colored urine     Metabolic acidosis     Acute kidney injury ([facility]     Acute renal failure (ARF) (HCC)     Thrombocytopenia (HCC)     Anemia     Hyponatremia     Jaundice     Fever, unspecified fever cause     Anemia, unspecified type     Ac

## 2021-02-28 NOTE — PLAN OF CARE
Patient to transfer from [facility] to 1. Report given to Mo sanchez RN. Medications, labs, plan of care reviewed. All belongings with patient. Nursing provided patient with current list of medications; reviewed purpose and side effects of medications.  No further qu RRT called to pt room for seizures, Dr. Colleen Markham at bedside, see orders. well developed

## 2021-03-01 NOTE — PLAN OF CARE
Pt alert and oriented x4. Azerbaijani speaking. No tremors noted. VSS. On room air. Tolerating diet, poor appetite. PO intake encouraged. Vega removed, voiding in urinal. IVF as ordered. Cr improving. K replaced. IV abx as ordered, pt encouraged PO intake.  Up ordered  - Implement neutropenic guidelines  Outcome: Progressing     Problem: SAFETY ADULT - FALL  Goal: Free from fall injury  Description: INTERVENTIONS:  - Assess pt frequently for physical needs  - Identify cognitive and physical deficits and behavior Implement strategies to promote bladder emptying  Outcome: Progressing     Problem: METABOLIC/FLUID AND ELECTROLYTES - ADULT  Goal: Electrolytes maintained within normal limits  Description: INTERVENTIONS:  - Monitor labs and rhythm and assess patient for

## 2021-03-01 NOTE — PROGRESS NOTES
[place] [facility] - [facility] ID PROGRESS NOTE    Agnieszka Resendez Patient Status:  Inpatient    1965 MRN [id number]   Location [facility] 2W/SW Attending Jerardo Winter, [address] [facility] [address] Day # 4 PCP Nena Rosales MD     Subjective:  R History of alcohol dependence (HCC)     Total bilirubin, elevated     Scleral icterus     Dark yellow-colored urine     Metabolic acidosis     Acute kidney injury ([facility]     Acute renal failure (ARF) (HCC)     Thrombocytopenia (HCC)     Anemia     Hypona

## 2021-03-01 NOTE — PROGRESS NOTES
[facility] - [facility]    Progress Note    Jeanne Chiang Patient Status:  Inpatient    1965 MRN [id number]   Location [facility] 4W/SW/SE Attending Jarek Grye MD   Hosp Day # 3 PCP Nena Rosales MD       Subjective:   Jeanne Chiang noted  Musculoskeletal: full ROM all extremities good strength  no deformities  Extremities: no edema, cyanosis  Neurological:  Grossly normal    Results:     Laboratory Data:  Lab Results   Component Value Date    WBC 11.6 (H) 02/28/2021    HGB 9.4 (L) 02 2/28/2021  Renea Angeles MD

## 2021-03-01 NOTE — PLAN OF CARE
No acute changes overnight. Patient denied having any pain overnight. No tremors reported by patient and none seen by this RN. IVF running. Vega intact and draining. Tolerating diet and no complaints of nausea/vomiting. Plan on going.  Will continue to mon Assess pt frequently for physical needs  - Identify cognitive and physical deficits and behaviors that affect risk of falls.   - Jayess fall precautions as indicated by assessment.  - Educate pt/family on patient safety including physical limitations  -

## 2021-03-02 PROBLEM — I21.4 NSTEMI (NON-ST ELEVATED MYOCARDIAL INFARCTION) (HCC): Status: RESOLVED | Noted: 2020-01-01 | Resolved: 2021-01-01

## 2021-03-02 NOTE — PHYSICAL THERAPY NOTE
PHYSICAL THERAPY EVALUATION - INPATIENT     Room Number: 461/461-A  Evaluation Date: 3/2/2021  Type of Evaluation: Initial   Physician Order: PT Eval and Treat    Presenting Problem: jaundice(Hx of ETOH abuse)  Reason for Therapy: Mobility Dysfunction and calculated based on documentation in the [facility] '6 clicks' Inpatient Basic Mobility Short Form. Research supports that patients with this level of impairment may benefit from home with HHPT.  Pt is refusing HHPT-informed  it is still recommende SUBJECTIVE  \"I need to walk to be better on my feet. \"     PHYSICAL THERAPY EXAMINATION     OBJECTIVE  Precautions: (Maltese speaking- needed)  Fall Risk: Standard fall risk    WEIGHT BEARING RESTRICTION  Weight Bearing Restriction: None tolerated  Gait training  Strengthening  Transfer training    Patient End of Session: Up in chair;Needs met;Call light within reach; All patient questions and concerns addressed;RN aware of session/findings    CURRENT GOALS    Goals to be met by: 3/9/21  Pa

## 2021-03-02 NOTE — PROGRESS NOTES
[place] [facility] - [facility]    Progress Note    Agnieszka Resendez Patient Status:  Inpatient    1965 MRN [id number]   Location [facility] 4W/SW/SE Attending Judy Dupont MD   Hosp Day # 4 PCP Nena Rosales MD       Subjective:   Agnieszka Resendez strength  no deformities  Extremities: no edema, cyanosis  Neurological:  Grossly normal    Results:     Laboratory Data:  Lab Results   Component Value Date    WBC 11.1 (H) 03/01/2021    HGB 9.5 (L) 03/01/2021    HCT 27.1 (L) 03/01/2021    PLT 66.0 (L) 03

## 2021-03-02 NOTE — PROGRESS NOTES
[facility]    Progress Note    Teagan Barney Patient Status:  Inpatient    1965 MRN [id number]   Location [facility] 2W/SW Attending Batool Carson MD   [facility] Day # 5 PCP Nena Rosales MD       Subjective:   Teagan Barney i 02/25/2021    PT 14.2 01/07/2014    TSH 2.830 02/28/2020     02/25/2021    PSA 0.6 01/09/2017    DDIMER <0.27 12/08/2017    MG 1.9 03/02/2021    PHOS 2.9 03/01/2021    TROP <0.045 02/25/2021    B12 564 01/09/2017    ETOH 145 (H) 12/03/2020       No

## 2021-03-02 NOTE — PLAN OF CARE
No acute medical changes during shift. Patient is A&O x4. Kazakh speaking; language line used for communication. Prn tylenol for headache. IVF continued. Low fiber soft diet with poor appetite. Denies tremors throughout shift. Creatine trending down.  Void Administer growth factors as ordered  - Implement neutropenic guidelines  Outcome: Progressing     Problem: SAFETY ADULT - FALL  Goal: Free from fall injury  Description: INTERVENTIONS:  - Assess pt frequently for physical needs  - Identify cognitive and p medication profile  - Implement strategies to promote bladder emptying  Outcome: Progressing     Problem: METABOLIC/FLUID AND ELECTROLYTES - ADULT  Goal: Electrolytes maintained within normal limits  Description: INTERVENTIONS:  - Monitor labs and rhythm a

## 2021-03-02 NOTE — PLAN OF CARE
Pt is alert x 4, voiding freely (denies any dysuria), tolerating general diet. Ready for discharge home today pending PT assessment. To follow up with GI. Plan of care discussed with patient by Dr. Chelita Matthews via [address] [facility] .     Problem: Patient Callie Identify cognitive and physical deficits and behaviors that affect risk of falls.   - Linden fall precautions as indicated by assessment.  - Educate pt/family on patient safety including physical limitations  - Instruct pt to call for assistance with act labs and rhythm and assess patient for signs and symptoms of electrolyte imbalances  - Administer electrolyte replacement as ordered  - Monitor response to electrolyte replacements, including rhythm and repeat lab results as appropriate  - Fluid restrictio

## 2021-03-02 NOTE — PLAN OF CARE
Discharge and follow up instructions, including 3 prescriptions, discussed with patient and wife using ipad . Discussed potential side effects of medications.  All questions discussed and answered - pt and wife verbalized understanding an

## 2021-03-02 NOTE — PROGRESS NOTES
[place] [facility]  Nephrology Daily Progress Note    Rahul Sanchez  [id number]  55year old      HPI:   Rahul Sanchez is a 55year old male. Overall states he feels okay. Up in chair. Eating fairly well.  No chest pain, shortness of breath, aler age    Labs:  Lab Results   Component Value Date    WBC 10.9 03/02/2021    HGB 8.9 03/02/2021    HCT 25.2 03/02/2021    PLT 71.0 03/02/2021    CREATSERUM 1.68 03/02/2021    BUN 30 03/02/2021     03/02/2021    K 3.4 03/02/2021     03/02/2021 mg, 100 mg, Oral, TID PRN  •  morphINE sulfate (PF) 2 MG/ML injection 1 mg, 1 mg, Intravenous, Q2H PRN  •  acetaminophen (TYLENOL) tab 650 mg, 650 mg, Oral, Q6H PRN  •  ondansetron HCl (ZOFRAN) injection 4 mg, 4 mg, Intravenous, Q6H PRN  •  Prochlorperazin

## 2021-03-02 NOTE — DISCHARGE SUMMARY
[facility]    Discharge Summary    Nga Pinon Patient Status:  Inpatient    1965 MRN [id number]   Location [facility] 4W/SW/SE Attending Baltazar Rucker MD   Hosp Day # 5 PCP Nena Rosales MD     Date of Admission:  renal failure secondary to sepsis  Hypertension  CAD  Cirrhosis of the liver secondary to alcohol use    Physical Exam:   General appearance: alert, appears stated age and cooperative  Pulmonary:  clear to auscultation bilaterally  Cardiovascular: S1, S2 n Zosyn  -cont iv ceftriaxone-->PO cipro tx. 10 days     Hypotension  -Was given IV sepsis bolus  -Required IV Levophed  -Now improved     Enteritis  Continue supportive care     Acute kidney injury  -From sepsis  -Urine output improving  -IV fluid per nephr Tabs  Commonly known as: LIPITOR      Take 1 tablet (40 mg total) by mouth nightly. Quantity: 90 tablet  Refills: 3     Diclofenac Sodium 1 % Gel  Commonly known as: VOLTAREN      APPLY 4 GRAMS TOPICALLY 4 (FOUR) TIMES DAILY.    Quantity: 100 g  Refills:

## 2021-03-02 NOTE — CM/SW NOTE
Patient discussed in rounds, discharge today. PT recommending home health. Spoke w/ patient with assistance from [address] [place] [address] for Vietnamese translation. Patient lives with his spouse & independent at baseline. Discussed home health services, patient declined.  Tone Swanson

## 2021-03-03 NOTE — PROGRESS NOTES
Initial Post Discharge Follow Up   Discharge Date: 3/2/21  Contact Date: 3/3/2021    Consent Verification:  Assessment Completed With: Patient  HIPAA Verified?   Yes    Discharge Dx:   Severe sepsis d/t UTI    General:   • How have you been since your [facility] TOPICALLY 4 (FOUR) TIMES DAILY.  100 g 0     • (NCM)  Were there any medication changes noted on AVS?  yes  o If so, were these medication changes discussed with you prior to leaving the hospital? yes  • (NCM) If a new medication was prescribed:    o Was th while in the hospital?  good     Interventions by NCM:  All d/c instructions reviewed with pt. Reviewed when to call MD vs when to go to ER/call 911.   Educated pt on the importance of taking all meds as prescribed as well as close f/u with PCP/specialists

## 2021-03-08 NOTE — PROGRESS NOTES
HPI:    Juan David Sexton is a 55year old male here today for hospital follow up.    He was discharged from Inpatient hospital, [facility]  to Home   Admission Date: 2/25/21   Discharge Date: 3/2/21  Hospital Discharge Diagnoses (since 2/6/2021) hepatology. Pt has upcoming appt with GI. Allergies:  He has No Known Allergies. Current Meds:  aspirin 325 MG Oral Tab, Take 325 mg by mouth daily. lisinopril 40 MG Oral Tab, Take 40 mg by mouth daily.   folic acid 1 MG Oral Tab, Take 1 tablet (1 m abnormality, atraumatic. PERRL, EOMI, pink conjunctiva.   Neck: supple, symmetrical, trachea midline, no adenopathy  Lungs: respirations unlabored, clear to auscultation bilaterally  Heart: regular rate and rhythm, S1, S2 normal, no murmur  Abdomen: normoac

## 2021-03-08 NOTE — PROGRESS NOTES
HPI:    Jesus Rasmussen is a 55year old male here today for hospital follow up.    He was discharged from {Discharged from which location:7092} to {Discharged to which location:7093::\"Home\"}   Admission Date: 2/25/21   Discharge Date: 3/2/21  Hospital  facility-administered medications on file prior to visit.         HISTORY: reconciled and review with patient  He  has a past medical history of Alcohol abuse, Anxiety, Essential hypertension, High blood pressure, High cholesterol, Hyperlipidemia, and Lipid TCM. Elements for Each Level of Medical Decision Making Type of Decision Making__ 2/3 High and seen within 7 days equals High 80951 whereas 2/3 Moderate and seen within 14 days = Moderate 81951:6984::\"moderate\"}    Patient seen within {7or14 Days:4430::\

## 2021-03-08 NOTE — PATIENT INSTRUCTIONS
START taking these medications      Instructions Prescription details   Ciprofloxacin HCl 500 MG Tabs  Commonly known as: CIPRO       Take 1 tablet (500 mg total) by mouth every 12 (twelve) hours for 5 days.    Stop taking on: March 7, 2021  Quantity: 10 t

## 2021-03-11 NOTE — PATIENT INSTRUCTIONS
Liver disease from alcohol/cirrhosis  - may see some improvement up to 6 months out  - stay off alcohol - Call Valentina Ruiz ( see referral)   - lab work   - liver ultrasound   - colonoscopy and EGD in 2 - 3 months /Trilyte prep and MAC ( screening colon and

## 2021-03-11 NOTE — PROGRESS NOTES
Jenn Levin is a 55year old male.     HPI:   Patient presents with:  Hospital F/U: has one dose left of vanco    The patient is a 56-year-old male who has a history of alcohol abuse, hyperlipidemia, high blood pressure who was hospitalized here at [facility] Smokeless tobacco: Never Used    Vaping Use      Vaping Use: Never used    Alcohol use: Not Currently      Comment: last drink 12/2/20, approx 15 cans of beed daily    Drug use: No       Medications (Active prior to today's visit):  Current Outpatient Me labs and we can continue to see some improvement of liver function between 3 and 6 months out. I have advised liver ultrasound and lab work-up today.     Additionally, the patient has chronic anemia, we discussed the possibilities here including bone Jeronimo Deis

## 2021-03-12 NOTE — TELEPHONE ENCOUNTER
Scheduled for:  Colonoscopy [phone] / [phone]  Provider Name:  Dr. Mauricio Hobbs  Date:  6/24/2021  Location:  [facility]  Sedation:  MAC  Time:  10:15 am, arrival 9:15 am  Prep:  Trilyte  Meds/Allergies Reconciled?:  Physician reviewed  Diagnosis with codes:  Screening

## 2021-03-12 NOTE — TELEPHONE ENCOUNTER
Dr. Neri Morel out of [place]  Is it ok if they substitute 236gm/Golytely since preps on nationwide backorder.     Thank you

## 2021-03-12 NOTE — TELEPHONE ENCOUNTER
Pharmacy called in stating that the medication peg 3350 is out of stock the pharmacy only has peg 236 available.  Please follow up

## 2021-03-16 NOTE — PROGRESS NOTES
Nutrition Assessment    Mario Childers is a 55year old male. Referred by:  Attending  Referring Physician Name: Alonso Loja     Medical Nutrition Therapy Comment: Elevated Liver Enzymes  Visit Information: Initial Visit 3/16/21    [facility] occasionally bread. He has lost 21 pounds since 2/25/21, when he was admitted to the hospital. He may have been retaining some fluid at that point with low urine output. Some of the weight change is likely due to the decrease food intake.  Discussed the imp Learn: Retain Information    Readiness to Learn:  Motivated    Barriers to Learning: Language    Intervention to Reduce Barriers:         3/16/2021  Mary Varner RD

## 2021-04-01 PROBLEM — N12 PYELONEPHRITIS: Status: ACTIVE | Noted: 2021-01-01

## 2021-04-01 PROBLEM — Z87.898 HISTORY OF BACTEREMIA: Status: ACTIVE | Noted: 2021-01-01

## 2021-04-01 PROBLEM — R79.89 ELEVATED PROCALCITONIN: Status: ACTIVE | Noted: 2021-01-01

## 2021-04-01 PROBLEM — N17.9 AKI (ACUTE KIDNEY INJURY) (HCC): Status: ACTIVE | Noted: 2021-01-01

## 2021-04-02 NOTE — PROGRESS NOTES
[facility]    Progress Note    Arlene Frederick Patient Status:  Inpatient    1965 MRN [id number]   Location [facility] 5SW/SE Attending Nadja Baum MD   [facility] Day # 1 PCP Nena Rosales MD     HPI/Subjective:   Ladi English ALKPHO 150 (H) 04/02/2021    BILT 6.2 (H) 04/02/2021    TP 6.6 04/02/2021    AST 69 (H) 04/02/2021    ALT 44 04/02/2021    PTT 45.6 (H) 04/01/2021    INR 1.76 (H) 04/01/2021    PT 14.2 01/07/2014    TSH 2.830 02/28/2020     02/25/2021    PSA 0.6 01/

## 2021-04-02 NOTE — PLAN OF CARE
Problem: Patient Centered Care  Goal: Patient preferences are identified and integrated in the patient's plan of care  Description: Interventions:  - What would you like us to know as we care for you?  Patient is from home with wife and is Mexican speakin Consider collaborating with pharmacy to review patient's medication profile  - Implement strategies to promote bladder emptying  Outcome: Progressing     Problem: METABOLIC/FLUID AND ELECTROLYTES - ADULT  Goal: Electrolytes maintained within normal limits diminished urine production, MD aware. Urine cultures and blood cultures pending.  used via language line to communicate with patient as he is Burundian speaking. Vitals stable. No complaints. Will continue to monitor patient.

## 2021-04-02 NOTE — PLAN OF CARE
Vega draining well. Iv merreopenem  Problem: Patient Centered Care  Goal: Patient preferences are identified and integrated in the patient's plan of care  Description: Interventions:  - What would you like us to know as we care for you?  Likes to eat frui retention protocol/standard of care  - Consider collaborating with pharmacy to review patient's medication profile  - Implement strategies to promote bladder emptying  Outcome: Progressing     Problem: METABOLIC/FLUID AND ELECTROLYTES - ADULT  Goal: Electr

## 2021-04-02 NOTE — ED NOTES
Orders for admission, patient is aware of plan and ready to go upstairs. Any questions, please call ED RN Rajan BREWER  at extension [phone].    Type of COVID test sent:  COVID Suspicion level: Low    Titratable drug(s) infusing:  Rate: none  IV MERREM infusing  Ne

## 2021-04-02 NOTE — ED PROVIDER NOTES
Patient Seen in: [facility] AND [facility] Emergency Department    History   Patient presents with:  Urinary Retention    Stated Complaint: pain when urinates     HPI    20-year-old male with past medical history of hypertension, dyslipidemia, alcohol abuse compli Tobacco Use      Smoking status: Former Smoker        Years: 15.00        Quit date:         Years since quittin.2      Smokeless tobacco: Never Used    Vaping Use      Vaping Use: Never used    Alcohol use: Not Currently      Comment: last drink 1 (*)     INR 1.76 (*)     All other components within normal limits   PTT, ACTIVATED - Abnormal; Notable for the following components:    PTT 45.6 (*)     All other components within normal limits   URINALYSIS WITH CULTURE REFLEX - Abnormal; Notable for the PLATELET.   Procedure                               Abnormality         Status                     ---------                               -----------         ------                     CBC W/ DIFFERENTIAL[[id number]]          Abnormal            Final resul note and/or contact this provider for further PPE details.     Disposition and Plan     Clinical Impression:  Pyelonephritis  (primary encounter diagnosis)  JACQUIE (acute kidney injury) ([facility] [address]  Elevated procalcitonin  History of bacteremia    Disposition:  Admi

## 2021-04-02 NOTE — ED INITIAL ASSESSMENT (HPI)
Pt c/o dysuria, bilateral flank pain, reports last void was last night at 20:00. Pt was admitted for same symptoms a month ago kidney issues.

## 2021-04-02 NOTE — H&P
4 [facility] Patient Status:  Inpatient    1965 MRN [id number]   Location [facility] 5SW/SE Attending Lisa Hernandes MD   Hosp Day # 1 PCP Nena Rosales MD     Date:  2021  Date of A TIMES DAILY. Patient not taking: Reported on 3/8/2021   Metoprolol Tartrate 50 MG Oral Tab 4/1/2021 at Unknown time  No Yes   Sig: Take 1 tablet (50 mg total) by mouth 2 (two) times daily.    aspirin 325 MG Oral Tab 4/1/2021 at Unknown time  Yes Yes   Sig lymphadenopathy neck, axilla, groin. Musculoskeletal: Normal range of motion. normal strength. Feet:  Normal pulses. Neurologic:  Alert, oriented, no focal deficits, cranial nerves II-XII are grossly intact.   Cognition and Speech:  Oriented, speech felice

## 2021-04-03 NOTE — PLAN OF CARE
Language device machine was utilized to obtain blood consent for patient who primary language is Monegasque speaking.  Spoke with  ID # [id number] named Teri Cleveland to effectively communicate with patient that the doctor would like him to receive 1

## 2021-04-03 NOTE — PLAN OF CARE
Problem: Patient Centered Care  Goal: Patient preferences are identified and integrated in the patient's plan of care  Description: Interventions:  - What would you like us to know as we care for you? Likes to eat fruit.  Home with wife  - Provide timely, Administer electrolyte replacement as ordered  - Monitor response to electrolyte replacements, including rhythm and repeat lab results as appropriate  - Fluid restriction as ordered  - Instruct patient on fluid and nutrition restrictions as appropriate  Ou light within reach.

## 2021-04-03 NOTE — PLAN OF CARE
Problem: Patient Centered Care  Goal: Patient preferences are identified and integrated in the patient's plan of care  Description: Interventions:  - What would you like us to know as we care for you? Likes to eat fruit.  Home with wife  - Provide timely, Consider collaborating with pharmacy to review patient's medication profile  - Implement strategies to promote bladder emptying  Outcome: Progressing     Problem: METABOLIC/FLUID AND ELECTROLYTES - ADULT  Goal: Electrolytes maintained within normal limits Patient is medicated as needed for pain or discomfort. Intravenous fluids infusing and tolerated. Patient had CT of Abdomen and pelvis today. Patient received 1 unit of fresh frozen plasma per order and tolerated it well.  Lasix 20 mg intravenous x 1 was ad

## 2021-04-03 NOTE — CONSULTS
[facility]     Report of Consultation    Doron Ching [address]  [phone]       Teagan Barney Patient Status:  Inpatient    1965 MRN [id number]   Location [facility] 5SW/SE Attending Chapis Guerrero MD   [facility] reports previous alcohol use. He reports that he does not use drugs.     Allergies:  No Known Allergies    Medications:    Current Facility-Administered Medications:   •  morphINE sulfate (PF) 2 MG/ML injection 2 mg, 2 mg, Intravenous, Q4H PRN  •  furosemid normal, no rashes or lesions   Lymph nodes:   No lymphadenopathy   Neurologic:   Grossly normal         Laboratory Data:  Lab Results   Component Value Date    WBC 10.8 04/03/2021    HGB 8.5 04/03/2021    PLT 68.0 04/03/2021    CREATSERUM 1.36 04/03/2021 PELVIS KIDNEYSTONE 2D RNDR (NO IV NO ORAL) (KDB=[id number])  COMPARISON: [facility], CT ABDOMEN+PELVIS (QQZ=[id number]), 2/25/2021, 5:02 PM.  INDICATIONS: Right flank pain.   TECHNIQUE:   CT images of the abdomen and pelvis were obtained without intra SPLEEN: Mild splenomegaly noted. No focal splenic abnormality identified. STOMACH: Tiny umbilical hernia. Stomach otherwise grossly unremarkable. Duodenum grossly unremarkable.  PANCREAS: Mild diffuse fatty atrophy of the pancreas without ductal dilatati noted.  This is similar to the comparison and could relate to prolonged NPO status. If patient has right upper quadrant symptoms consider further assessment with for gallbladder ultrasound.   4.  Moderate cardiomegaly with evidence for interstitial pulmona

## 2021-04-03 NOTE — PROGRESS NOTES
[facility] [facility] - [facility]    Progress Note    Liz Salinas Patient Status:  Inpatient    1965 MRN [id number]   Location [facility] 5SW/SE Attending David Wilson MD   [facility] Day # 2 PCP Nena Rosales MD     HPI/Subjective:   Thelma Mayo ALKPHO 150 (H) 04/02/2021    BILT 6.2 (H) 04/02/2021    TP 6.6 04/02/2021    AST 69 (H) 04/02/2021    ALT 44 04/02/2021    PTT 45.6 (H) 04/01/2021    INR 2.20 (H) 04/03/2021    PT 14.2 01/07/2014    TSH 2.830 02/28/2020     02/25/2021    PSA 0.6 01/ await finalization of cultures.     Morbid obesity with obstructive sleep apnea  BMI 43.3. Patient counseled regarding diet and exercise once current issues resolve.   Consider CPAP at night.     Prophylaxis  SCDs, heparin on hold considering elevated INR,

## 2021-04-03 NOTE — PLAN OF CARE
Prydeinig  was utilized by Urologist and spoke with Adam Mendez with ID [id number]. Wife and patient was able to communicate effectively to doctor with nurse present in the room.

## 2021-04-04 NOTE — PROGRESS NOTES
[place] [facility] - [facility]    Progress Note    Ace Matas Patient Status:  Inpatient    1965 MRN [id number]   Location [facility] 5SW/SE Attending Elieser Rae MD   [facility] Day # 3 PCP Nena Rosales MD     HPI/Subjective:   Maria Del Rosario Duncan (L) 04/04/2021    ALB 2.6 (L) 04/02/2021    ALKPHO 150 (H) 04/02/2021    BILT 6.2 (H) 04/02/2021    TP 6.6 04/02/2021    AST 69 (H) 04/02/2021    ALT 44 04/02/2021    PTT 45.6 (H) 04/01/2021    INR 2.20 (H) 04/03/2021    PT 14.2 01/07/2014    TSH 2.830 02/ ultrasound    Gram negative bacteremia  - continue IV abx,   - will await finalization of cultures. Acute blood loss anemia  - hgb down to 6.4,  - plan to transfuse 1 U PRBC today     Morbid obesity with obstructive sleep apnea  BMI 43.3.   Patient couns

## 2021-04-04 NOTE — PROGRESS NOTES
Patient resting comfortably in bed. Vital signs are stable. Hemoglobin has drifted down to 6.4. Platelet count is 09,790. PT was 2.2 yesterday. Patient has alcoholic hepatitis and cirrhosis. Bilirubin is 6.2.   Urine culture shows E. coli bacteria blo

## 2021-04-04 NOTE — PLAN OF CARE
Problem: Patient Centered Care  Goal: Patient preferences are identified and integrated in the patient's plan of care  Description: Interventions:  - What would you like us to know as we care for you? Likes to eat fruit.  Home with wife  - Provide timely, Consider collaborating with pharmacy to review patient's medication profile  - Implement strategies to promote bladder emptying  Outcome: Progressing     Problem: METABOLIC/FLUID AND ELECTROLYTES - ADULT  Goal: Electrolytes maintained within normal limits 1 unit of packed red blood cells this morning. Repeat H&H was 7.1/20.4. Notified Dr. Coleen Apple. Obtained orders to transfused patient with additional 1 unit of packed red blood cells.   device machine was utilized to effectively com

## 2021-04-04 NOTE — PLAN OF CARE
Problem: Patient Centered Care  Goal: Patient preferences are identified and integrated in the patient's plan of care  Description: Interventions:  - What would you like us to know as we care for you? Likes to eat fruit.  Home with wife  - Provide timely, Consider collaborating with pharmacy to review patient's medication profile  - Implement strategies to promote bladder emptying  Outcome: Progressing  Note: Vega inplace with mote than adequate urine output     Problem: METABOLIC/FLUID AND ELECTROLYTES -

## 2021-04-04 NOTE — CONSULTS
[facility]   Gastroenterology Consultation Note     Juni Sandoval  Patient Status:    Inpatient  Date of Admission:         4/1/2021, Hospital day #3  Attending:   Corwin Leo MD  PCP:     Jamar Hernandez.  MD Bobby    Reason for Consult Metoprolol Tartrate (LOPRESSOR) tab 50 mg, 50 mg, Oral, BID  •  atorvastatin (LIPITOR) tab 40 mg, 40 mg, Oral, Nightly  •  folic acid (FOLVITE) tab 1 mg, 1 mg, Oral, Daily  •  ondansetron HCl (ZOFRAN) injection 4 mg, 4 mg, Intravenous, Q6H PRN  •  0.9 % Na hematoma with compression of the renal parenchyma. 2. Nonobstructive bilateral renal calculi. 3. Over distended gallbladder without gallstones noted. This is similar to the comparison and could relate to prolonged NPO status.   If patient has right upper disease not indicate a coagulopathy in of itself. Certainly may have vitamin K deficiency. Treating with vitamin K for deficiency is reasonable. Would avoid FFP. Also recommend maintaining platelets > 87,709.     Recommend:  -administer vitamin K  -avoid

## 2021-04-04 NOTE — PLAN OF CARE
Language Device Machine was utilized to communicate to patient who speaks [place]  Spoke to [place],  ID # [id number] to inform patient that doctor order 1 unit of packed blood cells for patient to receive blood transfusion due to Low H&H this am. Dash Li

## 2021-04-05 NOTE — PLAN OF CARE
Problem: Patient Centered Care  Goal: Patient preferences are identified and integrated in the patient's plan of care  Description: Interventions:  - What would you like us to know as we care for you? Likes to eat fruit.  Home with wife  - Provide timely, needed  - Follow urinary retention protocol/standard of care  - Consider collaborating with pharmacy to review patient's medication profile  - Implement strategies to promote bladder emptying  Outcome: Progressing     Problem: METABOLIC/FLUID AND ELECTROLY

## 2021-04-05 NOTE — PROCEDURES
[facility]  Procedure Note    Juan David Sexton Patient Status:  Inpatient    1965 MRN [id number]   Location [facility] 5SW/SE Attending Leticia Short MD   [address] Day # 4 PCP Nena Rosales MD     Procedure: Right renal angiogr

## 2021-04-05 NOTE — PROGRESS NOTES
[facility]     Gastroenterology Progress Note    Carleen Garcia Patient Status:  Inpatient    1965 MRN [id number]   Location [facility] 5SW/SE Attending Julien Nicole MD   [facility] Day # 4 PCP MD Michelle Martinez Findings suggest increased fluid volume status of the patient and/or CHF.     Dictated by (CST): Hernan King MD on 4/05/2021 at 11:04 AM     Finalized by (CST): Hernan King MD on 4/05/2021 at 11:05 AM          CT ABDOMEN+PELVIS(CPT=74176)    Result Date: indicate a coagulopathy in of itself. Certainly may have vitamin K deficiency.     Treating with vitamin K for deficiency is reasonable. Would avoid FFP.  Also recommend maintaining platelets > 17,485.     Recommend:  -administer vitamin K  -avoid FFP  -tra

## 2021-04-05 NOTE — PROGRESS NOTES
[place] [facility] - [facility]    Progress Note    [facility] Patient Status:  Inpatient    1965 MRN [id number]   Location [facility] 5SW/SE Attending Conrad Rudd MD   [facility] Day # 4 PCP Nena Rosales MD     HPI/Subjective:   Marisa Rodriguez 04/05/2021    ALB 2.6 (L) 04/02/2021    ALKPHO 150 (H) 04/02/2021    BILT 6.2 (H) 04/02/2021    TP 6.6 04/02/2021    AST 69 (H) 04/02/2021    ALT 44 04/02/2021    PTT 45.6 (H) 04/01/2021    INR 1.78 (H) 04/04/2021    PT 14.2 01/07/2014    TSH 2.830 02/28/2 cultures growing Klebsiella,- continue IV abx at this time  - acute blood loss anemia - currently hgb stable, will continue to trend  - CXR 04/05 - shows findings c/w fluid overload plan for lasix 40mg IV x1  - worsening renal function - placed nephrology

## 2021-04-05 NOTE — IVS NOTE
Procedure hand off report given to Tori Benitez RN. Procedural access site is dry and intact with no signs and symptoms of bleeding and hematoma. Dr Owen Glasgow came to see pt /family at bedside post procedure.  Groin site check done with Tori Benitez RN, upon denis

## 2021-04-05 NOTE — PROGRESS NOTES
[facility]  Urology Consult Follow-Up Note    Kotajaved Kellymoi Patient Status:  Inpatient    1965 MRN [id number]   Location [facility] 5SW/SE Attending Zoie Rose MD   [facility] Day # 4 PCP Nena Martínez MD Klebsiella pneumoniae -  (no method available)     Ampicillin >=32 Resistant      Ampicillin + Sulbactam 8 Sensitive      Cefazolin <=4 Sensitive      Ciprofloxacin <=0.25 Sensitive      Gentamicin <=1 Sensitive      Meropenem <=0.25 Sensitive      Levo

## 2021-04-05 NOTE — PRE-SEDATION ASSESSMENT
[place] [facility]  IR Pre-Procedure Sedation Assessment    History of snoring or sleep or apnea?    No    History of previous problems with anesthesia or sedation  No    Physical Findings:  Neck: nl ROM  CV: RRR  PULM: normal respiratory rate/effor

## 2021-04-06 NOTE — PLAN OF CARE
Problem: Patient Centered Care  Goal: Patient preferences are identified and integrated in the patient's plan of care  Description: Interventions:  - What would you like us to know as we care for you? Likes to eat fruit.  Home with wife  - Provide timely, Consider collaborating with pharmacy to review patient's medication profile  - Implement strategies to promote bladder emptying  Outcome: Progressing     Problem: METABOLIC/FLUID AND ELECTROLYTES - ADULT  Goal: Electrolytes maintained within normal limits ability to communicate wants and needs  Outcome: Progressing    Pt vitals have been stable all day. Pt received 1 unit PRBCs this morning, no signs of reaction. Pt is in the chair now, assisted by PT. Family at bedside. Call light in reach.

## 2021-04-06 NOTE — PROGRESS NOTES
[facility]    Progress Note    Irvin Vasquez Patient Status:  Inpatient    1965 MRN [id number]   Location [facility] 5SW/SE Attending Caron Claros MD   [facility] Day # 5 PCP Nena Rosales MD     HPI/Subjective:   Mini Mike 2.3 (L) 04/06/2021    ALKPHO 136 (H) 04/06/2021    BILT 5.1 (H) 04/06/2021    TP 6.4 04/06/2021     (H) 04/06/2021    ALT 75 (H) 04/06/2021    PTT 45.6 (H) 04/01/2021    INR 1.78 (H) 04/04/2021    PT 14.2 01/07/2014    TSH 2.830 02/28/2020    LIP 13 sepsis  - on meropenem, will continue the same.   - Wbc worsening  - will trend     Acute kidney injury  - worsening   - Decreased urine output currently on IV fluids we will continue to monitor closely avoid nephrotoxic medications, including holding mu

## 2021-04-06 NOTE — CONSULTS
[facility]    PATIENT'S NAME: Larena Section PHYSICIAN: Lashae Alcala MD   CONSULTING PHYSICIAN: Cherise Apple MD   PATIENT ACCOUNT#:   [id number]    LOCATION:  [address] [place] [address]  Syed. #:   [id number]       [address] [place] [address] for many years. Apparently, he quit drinking in December 2020, but had been drinking up to 15 beers per day. There is also a possible history of underlying coronary artery disease.   He last had an echocardiogram done in December 2020 which showed a left crackles. HEART:  Regular rate and rhythm with an S4, but no other gallops, murmurs, or rubs. ABDOMEN:  Obese, nontender, without obvious organomegaly, masses, or bruits. Cannot totally exclude some mild ascites.   EXTREMITIES:  No clubbing, cyanosis, or

## 2021-04-06 NOTE — PHYSICAL THERAPY NOTE
PHYSICAL THERAPY EVALUATION - INPATIENT     Room Number: 531/531-A  Evaluation Date: 4/6/2021  Type of Evaluation: Initial   Physician Order: See Comment for Specific Order (weakness)    Presenting Problem: acute UTI with sepsis, JACQUIE, acute anemia  Reason Basic Mobility Short Form. Research supports that patients with this level of impairment may benefit from rehab stay. Pt was IND with ADLs prior to admission without assistive device. Currently MIN A with limited activity tolerance.  PT recommendation sub promotion; Body mechanics;Repositioning;Relaxation    COGNITION  · Overall Cognitive Status:  WFL - within functional limits  · Following Commands:  follows one step commands without difficulty    RANGE OF MOTION AND STRENGTH ASSESSMENT  Upper extremity ROM sequencing    Transfers: sit to stand tx with rolling walker at MIN A, verbal cues for proper hand placement     Exercise/Education Provided:  Bed mobility  Body mechanics  Energy conservation  Functional activity tolerated  Gait training  Strengthening  T

## 2021-04-06 NOTE — PHYSICAL THERAPY NOTE
Orders received, chart reviewed. Pt with acute UTI with sepsis, JACQUIE, acute anemia. Hg 6.8 this AM, to receive a blood transfusion today. Will f//u post-transfusion as appropriate, schedule permitting.     Jeanette Magaña, DPT  Physical Therapy  [facility] Link

## 2021-04-06 NOTE — PROGRESS NOTES
[facility]  Urology Follow-Up Note    Milli Cano Patient Status:  Inpatient    1965 MRN [id number]   Location [facility] 5SW/SE Attending Aaliyah Bennett MD   [address] Day # 5 PCP Asma M. Siegfried Ros, MD Rickey Halsted Susceptibility    Klebsiella pneumoniae -  (no method available)     Ampicillin >=32 Resistant      Ampicillin + Sulbactam 8 Sensitive      Cefazolin <=4 Sensitive      Ciprofloxacin <=0.25 Sensitive      Gentamicin <=1 Sensitive      Meropenem <=0.25 Sens PORTABLE  (CPT=71045)    Result Date: 4/6/2021  CONCLUSION:  1. Little change from April 5, 2021. 2. Cardiomegaly. 3. Prominent markings. 4. Scarring/atelectasis. Dictated by (CST): Kp Ace MD on 4/06/2021 at 7:03 AM     Finalized by (CST):  Wa

## 2021-04-06 NOTE — PROGRESS NOTES
[facility]  Nephrology Daily Progress Note    Arlene Frederick  [id number]  55year old      HPI:   Arlene Frederick is a 55year old male. R flank pain better. Still not very hungry but tolerating fluids. No CP or SOB. Remains afeb. 04/06/2021    HCT 19.2 04/06/2021    PLT 97.0 04/06/2021    CREATSERUM 2.68 04/06/2021    BUN 42 04/06/2021     04/06/2021    K 4.5 04/06/2021     04/06/2021    CO2 20.0 04/06/2021     04/06/2021    CA 7.5 04/06/2021    ALB 2.3 04/06/202 ABDOMEN+PELVIS(CPT=74176)    Result Date: 4/5/2021  CONCLUSION:   Extensive, acute right subcapsular and right perinephric hemorrhage, increased in size and density since 4/3/2021. Nonobstructing bilateral caliceal calculi.   Anasarca, significantly progre Allergies    Input/Output:    Intake/Output Summary (Last 24 hours) at 4/6/2021 1102  Last data filed at 4/6/2021 0454  Gross per 24 hour   Intake —   Output 950 ml   Net -950 ml          ASSESSMENT/PLAN:   Assessment   Patient Active Problem List:     Rachid Salas

## 2021-04-07 NOTE — PLAN OF CARE
No acute changes. Pt complaints of pain on right lower flank. PRN morphine given per MD orders. Pain controlled. Safety precautions maintained.    Problem: Patient Centered Care  Goal: Patient preferences are identified and integrated in the patient's plan ability to void and empty bladder  - Monitor intake/output and perform bladder scan as needed  - Follow urinary retention protocol/standard of care  - Consider collaborating with pharmacy to review patient's medication profile  - Implement strategies to pr providing instructions  - Ask \"yes/no\" questions to facilitate patient's ability to communicate wants and needs  Outcome: Progressing

## 2021-04-07 NOTE — CONSULTS
[place] [facility] ID CONSULT NOTE    Boby Snpeter Patient Status:  Inpatient    1965 MRN [id number]   Location [facility] 5SW/SE Attending Farooq Villarreal MD   [facility] Day # 6 PCP Nena Rosales MD       Reason for [facility] Family History   Problem Relation Age of Onset   • Hypertension Mother    • Hypertension Sister    • Heart Disorder Sister    • Diabetes Sister    • Cancer Neg       reports that he quit smoking about 2 years ago. He quit after 15.00 years of use.  He has n or tingling in the extremities. MUSCULOSKELETAL:  No muscle, back pain, joint pain or stiffness. Physical Exam:  Vital signs: Blood pressure 110/52, pulse 95, temperature 98 °F (36.7 °C), temperature source Oral, resp.  rate 24, height 170.2 cm (5' 7\") then discharged on ciprofloxacin finishing 3/5/2021. Now with T-max 99.6 on admission without hypotension and on room air. WBC 13 with significant pyuria, procalcitonin 0.38, lactic acid 2.3. Given IV meropenem. Fevers have resolved.   Admission urine c

## 2021-04-07 NOTE — PROGRESS NOTES
[place] [facility] - [facility]  Hospitalist Progress Note     Liz Renjered Patient Status:  Inpatient      55year old [facility] [id number]   Location 531/531-A Attending Trice Tidwell MD   Hosp Day # 6 PCP Nena Rosales MD     Assessment & Plan:   ---- ()/(52-78) 110/52  Gen: A+Ox2. No distress. Somnolent, mildly jaundiced  HEENT: NCAT, neck supple, no carotid bruit. CV: RRR, S1S2, and intact distal pulses. No gallop, rub, murmur.   Pulm: Effort and breath sounds normal. No distress, wheezes, ral Metoprolol Tartrate  50 mg Oral BID   • atorvastatin  40 mg Oral Nightly   • folic acid  1 mg Oral Daily   • Pantoprazole Sodium  40 mg Oral QAM AC   • vancomycin HCl  125 mg Oral Daily     morphINE sulfate (PF), ondansetron HCl, zolpidem, LORazepam, aceta

## 2021-04-07 NOTE — PROGRESS NOTES
[facility]  Urology Follow-Up Note    Jesus Rasmussen Patient Status:  Inpatient    1965 MRN [id number]   Location [facility] 5SW/SE Attending Lorraine Lopez MD   [facility] Day # 6 PCP MD Tanya Flanagan Rods (A) N/A       Susceptibility    Klebsiella pneumoniae -  (no method available)     Ampicillin >=32 Resistant      Ampicillin + Sulbactam 8 Sensitive      Cefazolin <=4 Sensitive      Ciprofloxacin <=0.25 Sensitive      Gentamicin <=1 Sensitive      Me Assessment and Plan:   55year old male who presented to [facility] with difficulty urinating, suprapubic pain, and right flank pain. History of alcoholic hepatitis. CT shows large right renal subcapsular hematoma.       Repeat CT done 4/5/21 shows extensive

## 2021-04-07 NOTE — PROGRESS NOTES
[facility]  Nephrology Daily Progress Note    Brunilda Farr  [id number]  55year old      HPI:   Brunilda Farr is a 55year old male. Feels okay. No real complaints. Lying flat and is reasonably comfortable.   Occasional right flank dis deformities  Extremities: Trace edema.   Neurological: exam appropriate for age reflexes and motor skills appropriate for age    Labs:  Lab Results   Component Value Date    WBC 19.8 04/07/2021    HGB 7.2 04/07/2021    HCT 20.5 04/07/2021    .0 04/07 ABDOMEN+PELVIS(CPT=74176)    Result Date: 4/5/2021  CONCLUSION:   Extensive, acute right subcapsular and right perinephric hemorrhage, increased in size and density since 4/3/2021. Nonobstructing bilateral caliceal calculi.   Anasarca, significantly progre hours) at 4/7/2021 1845  Last data filed at 4/7/2021 1718  Gross per 24 hour   Intake 1940 ml   Output 160 ml   Net 1780 ml          ASSESSMENT/PLAN:   Assessment   Patient Active Problem List:     Essential hypertension     Hypercholesteremia     Morbid o

## 2021-04-08 NOTE — PLAN OF CARE
Problem: Patient Centered Care  Goal: Patient preferences are identified and integrated in the patient's plan of care  Description: Interventions:  - What would you like us to know as we care for you? Likes to eat fruit.  Home with wife  - Provide timely, Assess and document skin integrity  - Monitor for areas of redness and/or skin breakdown  - Initiate interventions, skin care algorithm/standards of care as needed  4/8/2021 0459 by Shelia Parker RN  Outcome: Progressing  4/8/2021 0457 by Shelia Parker,

## 2021-04-08 NOTE — PROGRESS NOTES
[facility]  Hospitalist Progress Note     Edugabriela Ramos Patient Status:  Inpatient      55year old [facility] [id number]   Location 531/531-A Attending Goran Aguilar MD   Hosp Day # 7 PCP Nena Rosales MD     Assessment & Plan:   ---- (36.8 °C)  Pulse:  [] 97  Resp:  [24-26] 24  BP: (108-122)/(52-62) 122/62  Gen: A+Ox2. No distress. Appropriate, mildly jaundiced  HEENT: NCAT, neck supple, no carotid bruit. CV: RRR, S1S2, and intact distal pulses. No gallop, rub, murmur.   Pulm: BID   • folic acid  1 mg Oral Daily   • Pantoprazole Sodium  40 mg Oral QAM AC   • vancomycin HCl  125 mg Oral Daily     morphINE sulfate (PF), ondansetron HCl, zolpidem, LORazepam, acetaminophen      >35min spent, >50% spent counseling and coordinating ca

## 2021-04-08 NOTE — DIETARY NOTE
ADULT NUTRITION INITIAL ASSESSMENT    RECOMMENDATIONS TO MD:  None at this time    Pt is at moderate nutrition risk. Pt does not meet malnutrition criteria.       NUTRITION DIAGNOSIS/PROBLEM:  Inadequate oral intake related to decreased ability to consume mineral supplements:   none and Iron Sucrose  - Feeding assistance: NPO    - NUTRITION EDUCATION: assess education needs  - COORDINATION OF NUTRITION CARE: collaboration with other providers  _______________________________________________________________ failure ( K, phos, BUN, creat all elevated, discussing possible plan HD).    Recent Labs     04/06/21  0541 04/07/21  0552 04/08/21  0621   * 100* 103*   BUN 42* 52* 64*   CREATSERUM 2.68* 3.97* 5.36*   CA 7.5* 7.5* 7.6*   MG 2.3 2.4 2.4   * 12

## 2021-04-08 NOTE — BRIEF PROCEDURE NOTE
[facility]  Procedure Note    Marine Ching Patient Status:  Inpatient    1965 MRN [id number]   Location [facility] 5SW/SE Attending Yoana Ybarra MD   [facility] Day # 7 PCP Nena Rosales MD     Procedure: Temporary dialysis

## 2021-04-08 NOTE — PROGRESS NOTES
INFECTIOUS DISEASE PROGRESS NOTE  [facility] ID PROGRESS NOTE    Jerel Hernandez Patient Status:  Inpatient    1965 MRN [id number]   Location [facility] 5SW/SE Attending Marquis Wyatt MD   Hosp Day # 7 PCP Nena LIM ALT     Alcohol dependence (HCC)     Alcohol withdrawal syndrome without complication (HCC)     Anxiety     History of alcohol dependence (HCC)     Total bilirubin, elevated     Scleral icterus     Dark yellow-colored urine     Metabolic acidosis     Acute the admission symptoms resolved.     # K. pneumo bacteremia - from possible  source although UCx with E. coli  # Leukocytosis - r/o persistent bacteremia; no central line.  Possibly reactive to renal hematoma, anemia               - no clear hepatobiliary

## 2021-04-08 NOTE — PROGRESS NOTES
Patient in IR for Temp dialysis catheter placement completed in rm 531. Timeout/universal protocol completed. 7 ml of 2% lidocaine given by YAZAN Bocanegra MD to R. Neck. Patient monitored, VSS during procedure.  Report given to [facility]

## 2021-04-08 NOTE — PROGRESS NOTES
[facility]  Urology Follow-Up Note    Boby Molina Patient Status:  Inpatient    1965 MRN [id number]   Location [facility] 5SW/SE Attending Konstantin Harry MD   [facility] Day # 7 PCP MD Kerline Doll Klebsiella pneumoniae -  (no method available)     Ampicillin >=32 Resistant      Ampicillin + Sulbactam 8 Sensitive      Cefazolin <=4 Sensitive      Ciprofloxacin <=0.25 Sensitive      Gentamicin <=1 Sensitive      Meropenem <=0.25 Sensitive      Levoflo interstitial lung markings. Similar findings were seen previously and possibly chronic. Mild acute superimposed interstitial edema not excluded.     Dictated by (CST): Yoana Thomas MD on 4/08/2021 at 7:50 AM     Finalized by (CST): Yoana Thomas, RN, Dr. Concepcion Bui, and Dr. Oswaldo Baron. Liliana Beasley, APRN  4/8/2021

## 2021-04-09 NOTE — PROGRESS NOTES
Code Blue    *See Code Blue Documentation Record*    Reason the code blue was called: Patient stopped breathing, lost pulse  Assessment of patient leading up to code: Rapid response called, patient cool and clammy, altered mental status, blood pressure 80/

## 2021-04-09 NOTE — PROGRESS NOTES
Report received from night RN. Patient  at 46.  Family still present at this time, will complete post mortem care when family has finished visiting

## 2021-04-09 NOTE — PROGRESS NOTES
Code blue called again at [phone], witnessed arrest. SB/PEA. CPR started. 2 rounds of Epi given, ROSC achieved at 0432. BP dropping again at 0440. Pt's wife verbalizes that she doesn't want any further CPR provided if pt loses pulse again.  Family wishes expre

## 2021-04-09 NOTE — CONSULTS
Pulmonary/Critical Care Consultation Note    HPI:   Juan David Sexton is a 55year old male with chief complaint of Patient presents with:  Urinary Retention    Nena Murcia MD    I was asked by the attending physician to see this patient in pulmonary/critic Intravenous, Once  sodium chloride 0.9% IV bolus 100 mL, 100 mL, Intravenous, Once PRN  Albumin Human (ALBUMINAR) 25 % solution 100 mL, 100 mL, Intravenous, PRN  [COMPLETED] sodium chloride 0.9% IV bolus 500 mL, 500 mL, Intravenous, Once  [COMPLETED] sodiu MG/2ML injection, , ,   [COMPLETED] Iopamidol (ISOVUE-M) 61 % injection 50 mL, 50 mL, Injection, ONCE PRN  HYDROmorphone HCl (DILAUDID) 1 MG/ML injection 0.5 mg, 0.5 mg, Intravenous, Once  [COMPLETED] 0.9% NaCl infusion, , Intravenous, Once  [COMPLETED] 0. Use      Smoking status: Former Smoker        Years: 15.00        Quit date:         Years since quittin.2      Smokeless tobacco: Never Used    Vaping Use      Vaping Use: Never used    Substance and Sexual Activity      Alcohol use: Not Currently subacute right subcapsular and right perinephric hemorrhage, not significantly changed in size and density since 4/5/2021.       There is significant mass effect upon the right renal parenchyma.  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  No hydronephrosis.

## 2021-04-09 NOTE — SIGNIFICANT EVENT
Received patient from floor s/p cardiac arrest.  Was shocked given amio with return of perfusing rhythm. Patient very ill prior to code with acute renal failure, liver failure with coagulopathy and intraabdominal bleeding.   He was intubated during the cod

## 2021-04-09 NOTE — PROGRESS NOTES
[place] [facility] - [facility]    Progress Note    Irvin Vasquez Patient Status:  Inpatient    1965 MRN [id number]   Location [facility] 2W/SW Attending Buster Puentes MD   [facility] Day # 7 PCP Nena Rosales MD       Subjective:   Irvin Vasquez is 12/08/2017    MG 2.4 04/08/2021    PHOS 5.5 (H) 04/08/2021    TROP <0.045 02/25/2021    B12 564 01/09/2017    ETOH 145 (H) 12/03/2020       Imaging:  [unfilled]   CT ABDOMEN+PELVIS(CPT=74176)    Result Date: 4/8/2021  CONCLUSION:   Extensive subacute r excluded.     Dictated by (CST): Brooklyn Mercer MD on 4/08/2021 at 7:50 AM     Finalized by (CST): Brooklyn Mercer MD on 4/08/2021 at 7:52 AM              ASSESSMENT/PLAN:   Assessment       Saw patient this afternoon was crying in pain was not respond

## 2021-04-09 NOTE — SIGNIFICANT EVENT
Code blue  Patient bradycardic to pea. ACLS/CPR after few minutes he did regain pulse. Family at bedside. Continue full supportive care.

## 2021-04-09 NOTE — PROGRESS NOTES
Pt  at 0523 w/ dgtr/wife present. TOD called by Dr Cyril Tariq. [facility] notified of death, spoke w/ rep Ike Hagan, states pt is not a candidate for anything d/t positive blood cultures. [facility]#[id number].    also notified, no case opened, gave okay to release danika

## 2021-04-09 NOTE — PROGRESS NOTES
Awaiting for bariatric post-mortem kit from unit distribution. Family still at bedside, waiting for son to arrive. Post-mortem care endorsed to [facility], RN.

## 2021-04-09 NOTE — PLAN OF CARE
Patient had one unit given this morning, another unit ordered but Dr. Tita French instructed to wait and give with dialysis this evening. Temporary catheter placed for dialysis to start tonight.  Patient pulled IV out with no access, blood pressure dropped and rap for bleeding and/or hematoma  - Assess quality of pulses, skin color and temperature  - Assess for signs of decreased coronary artery perfusion - ex.  Angina  - Evaluate fluid balance, assess for edema, trend weights  Outcome: Not Progressing     Problem: G

## 2021-04-09 NOTE — RESPIRATORY THERAPY NOTE
Pt received from scout/Code Blue, intubated with ETT size 7.5 secured at 26 cm at the lip, placed on vent with the settings: AC/VC 14/500/+5/100%. Pt tolerating well, saturating appropriately.     @ 2000 ETT withdraw  to 24 cm at the lip per X-Rays and

## 2021-04-09 NOTE — PROGRESS NOTES
Cont aggressive resuscitation. Received multiple units PRBC's and ffp. Oozing from sites. Maxed on 3 pressors. Bicarb drip infusing, received kayexalate. Renal aware K 7.1, no urine output. Family at bedside.

## 2021-04-09 NOTE — PROGRESS NOTES
Responded to RRT and ensuing Code Blue. Provided support to pt's niece and wife, then to the rest of the family as they arrived. Provided a non-anxious presence, reflective listening, encouragement, and prayer.        04/08/21 8832   Clinical Encounte

## 2021-04-09 NOTE — PROGRESS NOTES
Around , patient had Temporary dialysis catheter placed. Prior to procedure patient was premedicated with Morphine around 1500 and Ativan around 1400 due to anxiousness and pulling at catheters.  Family brought to family room and advised only one visito

## 2021-04-09 NOTE — PROGRESS NOTES
Received patient at 1930 s/p cardiac arrest. Pt arrived to CCU hypotensive, SBP 60s. Amio gtt stopped. HR 90s-100s, initially afib; now NSR 70s. Pt currently on 3 pressors, all infusing at max rates (levo, vaso, nii); pt remains hypotensive.  Hgb critically

## 2021-04-09 NOTE — PROGRESS NOTES
RRT    *See RRT Documentation Record*    Reason the RRT was called: Altered mental status  Assessment of patient leading up to RRT: Patient cool, clammy and moaning in pain. Patient not responding to this RN. Blood pressure 80/25.   Interventions/Testing: DAWSON

## 2021-04-09 NOTE — ANESTHESIA PROCEDURE NOTES
Airway  Date/Time: 4/8/2021 7:08 PM  Urgency: emergent    Airway not difficult    General Information and Staff    Patient location during procedure: floor  Anesthesiologist: Roberto Carlos Milligan MD  Performed: anesthesiologist     Consent for Airway (if perfor

## 2021-04-15 ENCOUNTER — TELEPHONE (OUTPATIENT)
Dept: PULMONOLOGY | Facility: CLINIC | Age: 56
End: 2021-04-15

## 2021-04-16 NOTE — TELEPHONE ENCOUNTER
Gregorio[facility] called to clarify cause of death on certificate. The print is very light and he can't confirm it. Please call.

## 2021-04-16 NOTE — TELEPHONE ENCOUNTER
Spoke with Luis Hunt at [facility], clarified causes of death on death certificate. Will await courtesy copy.

## 2021-05-03 DIAGNOSIS — I10 ESSENTIAL HYPERTENSION: ICD-10-CM

## 2021-05-03 RX ORDER — METOPROLOL TARTRATE 50 MG/1
50 TABLET, FILM COATED ORAL 2 TIMES DAILY
Qty: 180 TABLET | Refills: 1 | OUTPATIENT
Start: 2021-05-03

## 2021-05-03 NOTE — TELEPHONE ENCOUNTER
Talking with the pharmacy this is the correct patient. Instructed that we have the patient as . She will call the family members.

## 2021-05-12 NOTE — DISCHARGE SUMMARY
[place] [facility] - [facility]  Discharge Summary    Chemo Arzate Patient Status:  Inpatient    1965 MRN [id number]   Location [facility] 2W/SW Attending Kin Chowdhury, [address] [facility] [address] Day # 8 PCP Nena Rosales MD     Date of Admission: 2021 bleeding, and decomp liver disease. Pt had elie cath today for planned RRT and then cardiac arrest. CPR provided and shock x 1 with [facility]. More anemia and now 3 rd unit pRBC running. Afib RVR and better on amio. Pressors with vapo and max norepi.  Family i

## 2021-08-09 ENCOUNTER — APPOINTMENT (OUTPATIENT)
Dept: CARDIOLOGY | Age: 56
End: 2021-08-09

## 2022-02-28 NOTE — PROGRESS NOTES
[facility]    Progress Note    Tj Rosas Patient Status:  Inpatient    1965 MRN [id number]   Location [facility] 2W/SW Attending Sherry Lin MD   [facility] Day # 4 PCP Nena Rosales MD       Subjective:   Tj Rosas i Procedure:  COLONOSCOPY    Relevant Problems   CARDIO   (+) Hypertension        Physical Exam    Airway    Mallampati score: II  TM Distance: >3 FB  Neck ROM: full     Dental   No notable dental hx     Cardiovascular  Cardiovascular exam normal    Pulmonary  Pulmonary exam normal     Other Findings        Anesthesia Plan  ASA Score- 2     Anesthesia Type- IV sedation with anesthesia with ASA Monitors  Additional Monitors:   Airway Plan:           Plan Factors-    Chart reviewed  Patient summary reviewed  Patient is not a current smoker  Induction- intravenous  Postoperative Plan-     Informed Consent- Anesthetic plan and risks discussed with patient  I personally reviewed this patient with the CRNA  Discussed and agreed on the Anesthesia Plan with the CRNA  Ramesh Joy 14.2 01/07/2014    TSH 2.830 02/28/2020     02/25/2021    PSA 0.6 01/09/2017    DDIMER <0.27 12/08/2017    MG 2.0 03/01/2021    PHOS 2.9 03/01/2021    TROP <0.045 02/25/2021    B12 564 01/09/2017    ETOH 145 (H) 12/03/2020       No results found.

## 2022-09-02 NOTE — PROGRESS NOTES
[facility] Pharmacy Note:  Renal Adjustment for piperacillin/tazobactam (Sander Nava)    Rahul Sanchez is a 55year old patient who has been prescribed piperacillin/tazobactam (ZOSYN) 4.5 gm every 8 hrs.   The estimated creatinine clearance is 19.4 mL/min (A) (based on Home with home care

## 2023-09-09 NOTE — PLAN OF CARE
Problem: Patient Centered Care  Goal: Patient preferences are identified and integrated in the patient's plan of care  Description: Interventions:  - What would you like us to know as we care for you? Likes to eat fruit.  Home with wife  - Provide timely, Negative ordered  - Monitor response to electrolyte replacements, including rhythm and repeat lab results as appropriate  - Fluid restriction as ordered  - Instruct patient on fluid and nutrition restrictions as appropriate  Outcome: Progressing     Problem: SKIN/T

## 2023-12-13 NOTE — PROGRESS NOTES
Patient presents with:  Urinary Symptoms: concerned w/ urine color  Derm Problem: concerned w/ yellowish color of face    HPI:   Arlene Frederick is a 55year old male who presents to clinic with complaints of dark yellow urine and yellow tinge to skin and e PANEL (14); Future  - GASTRO - INTERNAL    5. History of alcohol dependence ([address]  - currently sober and stopping ativan. - GASTRO - INTERNAL    6. Hepatic steatosis  12/2020 Abd US: 1.  There is diffuse increase in the echogenicity of the liver compatibl [id number]

## 2024-04-29 NOTE — TELEPHONE ENCOUNTER
Anesthesia Pre Eval Note    Anesthesia ROS/Med Hx    Overall Review:  Echo was reviewed     Anesthetic Complication History:    Patient does not have a history of anesthetic complications      Pulmonary Review:    Positive for sleep apnea - CPAP    Cardiovascular Review:   Exercise tolerance: good (>4 METS)  Positive for valvular problems/murmurs  Positive for hypertension  Positive for hyperlipidemia    GI/HEPATIC/RENAL Review:  Patient does not have a GI/hepatic/renalhistory       End/Other Review:    Positive for obesity class I - 30.00 - 34.99  Additional Results:   Echo:  No results found for: \"EF\"   ALLERGIES:  No Known Allergies   Last Labs        Component                Value               Date/Time                  WBC                      5.9                 11/21/2023 1659            RBC                      4.94                11/21/2023 1659            HGB                      15.0                11/21/2023 1659            HCT                      43.8                11/21/2023 1659            MCV                      88.7                11/21/2023 1659            MCH                      30.4                11/21/2023 1659            MCHC                     34.2                11/21/2023 1659            RDW-CV                   12.2                11/21/2023 1659            Sodium                   141                 10/25/2023 1215            Potassium                4.2                 10/25/2023 1215            Chloride                 108                 10/25/2023 1215            Carbon Dioxide           26                  10/25/2023 1215            Glucose                  114 (H)             10/25/2023 1215            BUN                      15                  10/25/2023 1215            Creatinine               0.82                10/25/2023 1215            Glomerular Filtrati*     >90                 10/25/2023 1215            Calcium                  9.0                 10/25/2023 1215    Rx approved for 90 days per protocol.     Hypertensive Medications  Protocol Criteria:  · Appointment scheduled in the past 6 months or in the next 3 months  · BMP or CMP in the past 12 months  · Creatinine result < 2  Recent Outpatient Visits            5          PLT                      200                 11/21/2023 1659        Past Medical History:  12/21/2022: Acute appendicitis with localized peritonitis, without   perforation, abscess, or gangrene  No date: Chronic rhinitis  No date: Colon polyps  07/2020: COVID-19  06/07/2019: Elevated cholesterol with high triglycerides  No date: Heart murmur  No date: TIMBO on CPAP  No date: Sebaceous cyst  No date: Umbilical hernia  Past Surgical History:  12/22/2022: Appendectomy  No date: Colonoscopy  No date: Cyst removal  No date: Fix infrapatella tendon,primary  05/23/2022: Hernia repair      Comment:  Robotic assisted laparoscopic ventral umbilical hernia                repair with mesh  Dr Bueno  No date: Shoulder arthroscopy   Prior to Admission medications :  Medication omeprazole (PriLOSEC) 40 MG capsule, Sig Take 1 capsule by mouth in the morning and 1 capsule in the evening., Start Date 1/11/24, End Date , Taking? , Authorizing Provider Samy Gutierrez, DO    Medication Multiple Vitamin (MULTIVITAMIN ADULT PO), Sig Take by mouth daily., Start Date , End Date , Taking? , Authorizing Provider Provider, Outside         No data found.  Social history reviewed:  Social History     Tobacco Use   Smoking Status Former    Types: Cigars   Smokeless Tobacco Never        E-Cigarette/Vaping Substances & Devices    E-Cigarette/Vaping Use Never Used     Nicotine No     THC No     CBD No     Flavoring No     Disposable No     Pre-filled or Refillable Cartridge No     Refillable Tank No     Pre-filled Pod No        Social History     Substance and Sexual Activity   Alcohol Use Yes    Alcohol/week: 10.0 standard drinks of alcohol    Types: 10 Standard drinks or equivalent per week    Comment: 1-2/week           Relevant Problems   Anesthesia Problems   (+) Obstructive sleep apnea       Physical Exam     Airway   Mallampati: III  TM Distance: >3 FB  Neck ROM: Full  Neck: Non-tender and Able to place in sniff position  TMJ  Mobility: Good    Cardiovascular  Cardiovascular exam normal  Cardio Rhythm: Regular  Cardio Rate: Normal    Head Assessment  Head assessment: Normocephalic and Atraumatic    General Assessment  General Assessment: Alert and oriented and No acute distress    Dental Exam  Dental exam normal    Pulmonary Exam  Pulmonary exam normal  Breath sounds clear to auscultation:  Yes    Abdominal Exam  Abdominal exam normal      Anesthesia Plan:    ASA Status: 3  Anesthesia Type: MAC    Induction: Intravenous  Preferred Airway Type: Mask  Maintenance: TIVA    Post-op Pain Management: Per Surgeon      Checklist  Reviewed: NPO Status, Allergies, Medications, Problem list, Past Med History and Patient Summary  Consent/Risks Discussed Statement:  The proposed anesthetic plan, including its risks and benefits, have been discussed with the Patient along with the risks and benefits of alternatives. Questions were encouraged and answered and the patient and/or representative understands and agrees to proceed.        I discussed with the patient (and/or patient's legal representative) the risks and benefits of the proposed anesthesia plan, MAC, which may include services performed by other anesthesia providers.    Alternative anesthesia plans, if available, were reviewed with the patient (and/or patient's legal representative). Discussion has been held with the patient (and/or patient's legal representative) regarding risks of anesthesia, which include Intra-operative Awareness and emergent situations that may require change in anesthesia plan.    The patient (and/or patient's legal representative) has indicated understanding, his/her questions have been answered, and he/she wishes to proceed with the planned anesthetic.    Blood Products: Not Anticipated
